# Patient Record
Sex: FEMALE | Race: BLACK OR AFRICAN AMERICAN | NOT HISPANIC OR LATINO | Employment: OTHER | URBAN - METROPOLITAN AREA
[De-identification: names, ages, dates, MRNs, and addresses within clinical notes are randomized per-mention and may not be internally consistent; named-entity substitution may affect disease eponyms.]

---

## 2017-09-25 ENCOUNTER — HOSPITAL ENCOUNTER (OUTPATIENT)
Dept: MEDSURG UNIT | Facility: HOSPITAL | Age: 62
End: 2017-09-26
Attending: INTERNAL MEDICINE | Admitting: INTERNAL MEDICINE

## 2017-09-25 LAB
ABS NEUT (OLG): 4.69 X10(3)/MCL (ref 2.1–9.2)
ALBUMIN SERPL-MCNC: 4 GM/DL (ref 3.4–5)
ALBUMIN/GLOB SERPL: 1 RATIO (ref 1–2)
ALP SERPL-CCNC: 43 UNIT/L (ref 45–117)
ALT SERPL-CCNC: 18 UNIT/L (ref 12–78)
APPEARANCE, UA: CLEAR
AST SERPL-CCNC: 13 UNIT/L (ref 15–37)
BACTERIA #/AREA URNS AUTO: ABNORMAL /[HPF]
BASOPHILS # BLD AUTO: 0.06 X10(3)/MCL
BASOPHILS NFR BLD AUTO: 1 % (ref 0–1)
BILIRUB SERPL-MCNC: 0.5 MG/DL (ref 0.2–1)
BILIRUB UR QL STRIP: NEGATIVE
BILIRUBIN DIRECT+TOT PNL SERPL-MCNC: 0.1 MG/DL
BILIRUBIN DIRECT+TOT PNL SERPL-MCNC: 0.4 MG/DL
BNP BLD-MCNC: <20 PG/ML (ref 0–100)
BUN SERPL-MCNC: 15 MG/DL (ref 7–18)
CALCIUM SERPL-MCNC: 9.1 MG/DL (ref 8.5–10.1)
CHLORIDE SERPL-SCNC: 97 MMOL/L (ref 98–107)
CK MB SERPL-MCNC: 1.5 NG/ML (ref 1–3.6)
CK SERPL-CCNC: 147 UNIT/L (ref 26–192)
CO2 SERPL-SCNC: 30 MMOL/L (ref 21–32)
COLOR UR: ABNORMAL
CREAT SERPL-MCNC: 0.8 MG/DL (ref 0.6–1.3)
EOSINOPHIL # BLD AUTO: 0.54 10*3/UL
EOSINOPHIL NFR BLD AUTO: 6 % (ref 0–5)
ERYTHROCYTE [DISTWIDTH] IN BLOOD BY AUTOMATED COUNT: 13.2 % (ref 11.5–14.5)
GLOBULIN SER-MCNC: 4.5 GM/ML (ref 2.3–3.5)
GLUCOSE (UA): NORMAL
GLUCOSE SERPL-MCNC: 129 MG/DL (ref 74–106)
HCO3 UR-SCNC: 31.3 MMOL/L (ref 22–26)
HCT VFR BLD AUTO: 36.7 % (ref 35–46)
HGB BLD-MCNC: 12.1 GM/DL (ref 12–16)
HGB UR QL STRIP: NEGATIVE
HYALINE CASTS #/AREA URNS LPF: ABNORMAL /[LPF]
IMM GRANULOCYTES # BLD AUTO: 0.04 10*3/UL
IMM GRANULOCYTES NFR BLD AUTO: 0 %
KETONES UR QL STRIP: NEGATIVE
LEUKOCYTE ESTERASE UR QL STRIP: 75 LEU/UL
LYMPHOCYTES # BLD AUTO: 2.56 X10(3)/MCL
LYMPHOCYTES NFR BLD AUTO: 30 % (ref 15–40)
MCH RBC QN AUTO: 28.5 PG (ref 26–34)
MCHC RBC AUTO-ENTMCNC: 33 GM/DL (ref 31–37)
MCV RBC AUTO: 86.6 FL (ref 80–100)
MONOCYTES # BLD AUTO: 0.71 X10(3)/MCL
MONOCYTES NFR BLD AUTO: 8 % (ref 4–12)
MUCOUS THREADS URNS QL MICRO: ABNORMAL
NEUTROPHILS # BLD AUTO: 4.69 X10(3)/MCL
NEUTROPHILS NFR BLD AUTO: 54 X10(3)/MCL
NITRITE UR QL STRIP: NEGATIVE
O2 HGB ARTERIAL: 93.1 % (ref 94–100)
PCO2 BLDA: 42 MMHG (ref 35–45)
PH SMN: 7.48 [PH] (ref 7.35–7.45)
PH UR STRIP: 5.5 [PH] (ref 4.5–8)
PLATELET # BLD AUTO: 358 X10(3)/MCL (ref 130–400)
PMV BLD AUTO: 11 FL (ref 7.4–10.4)
PO2 BLDA: 64 MMHG (ref 75–100)
POC ALLENS TEST: POSITIVE
POC BE: 7 (ref -2–2)
POC CO HGB: 2.1 % (ref 0.5–1.5)
POC CO2: 32.6 MMOL/L (ref 22–27)
POC MET HGB: 0.8 % (ref 0–1.5)
POC SAMPLESOURCE: ABNORMAL
POC SATURATED O2: 95.9 % (ref 95–98)
POC SITE: ABNORMAL
POC THB: 13 GM/DL (ref 12–18)
POC TREATMENT: ABNORMAL
POC TROPONIN: 0 NG/ML (ref 0–0.08)
POTASSIUM SERPL-SCNC: 3.1 MMOL/L (ref 3.5–5.1)
PROT SERPL-MCNC: 8.5 GM/DL (ref 6.4–8.2)
PROT UR QL STRIP: NEGATIVE
RBC # BLD AUTO: 4.24 X10(6)/MCL (ref 4–5.2)
RBC #/AREA URNS AUTO: ABNORMAL /[HPF]
SODIUM SERPL-SCNC: 136 MMOL/L (ref 136–145)
SP GR UR STRIP: 1.01 (ref 1–1.03)
SQUAMOUS #/AREA URNS LPF: ABNORMAL /[LPF]
TROPONIN I SERPL-MCNC: <0.015 NG/ML (ref 0–0.05)
UROBILINOGEN UR STRIP-ACNC: NORMAL
WBC # SPEC AUTO: 8.6 X10(3)/MCL (ref 4.5–11)
WBC #/AREA URNS AUTO: ABNORMAL /HPF

## 2017-09-26 LAB
BUN SERPL-MCNC: 14 MG/DL (ref 7–18)
CALCIUM SERPL-MCNC: 9.3 MG/DL (ref 8.5–10.1)
CHLORIDE SERPL-SCNC: 98 MMOL/L (ref 98–107)
CO2 SERPL-SCNC: 29 MMOL/L (ref 21–32)
CREAT SERPL-MCNC: 0.8 MG/DL (ref 0.6–1.3)
GLUCOSE SERPL-MCNC: 290 MG/DL (ref 74–106)
POTASSIUM SERPL-SCNC: 4.1 MMOL/L (ref 3.5–5.1)
SODIUM SERPL-SCNC: 134 MMOL/L (ref 136–145)

## 2017-10-09 ENCOUNTER — HISTORICAL (OUTPATIENT)
Dept: INTERNAL MEDICINE | Facility: CLINIC | Age: 62
End: 2017-10-09

## 2017-10-13 ENCOUNTER — HISTORICAL (OUTPATIENT)
Dept: INTERNAL MEDICINE | Facility: CLINIC | Age: 62
End: 2017-10-13

## 2017-10-13 LAB
APPEARANCE, UA: CLEAR
BACTERIA #/AREA URNS AUTO: ABNORMAL /[HPF]
BILIRUB UR QL STRIP: NEGATIVE
CHOLEST SERPL-MCNC: 134 MG/DL
CHOLEST/HDLC SERPL: 2.8 {RATIO} (ref 0–4.4)
COLOR UR: YELLOW
CREAT UR-MCNC: 206 MG/DL
EST. AVERAGE GLUCOSE BLD GHB EST-MCNC: 194 MG/DL
GLUCOSE (UA): NORMAL
HBA1C MFR BLD: 8.4 % (ref 4.2–6.3)
HDLC SERPL-MCNC: 48 MG/DL
HGB UR QL STRIP: NEGATIVE
HYALINE CASTS #/AREA URNS LPF: ABNORMAL /[LPF]
KETONES UR QL STRIP: NEGATIVE
LDLC SERPL CALC-MCNC: 54 MG/DL (ref 0–130)
LEUKOCYTE ESTERASE UR QL STRIP: NEGATIVE
MICROALBUMIN UR-MCNC: 11.6 MG/L (ref 0–19)
MICROALBUMIN/CREAT RATIO PNL UR: 5.6 MCG/MG CR (ref 0–29)
NITRITE UR QL STRIP: NEGATIVE
PH UR STRIP: 6 [PH] (ref 4.5–8)
PROT UR QL STRIP: 20 MG/DL
RBC #/AREA URNS AUTO: ABNORMAL /[HPF]
SP GR UR STRIP: 1.02 (ref 1–1.03)
SQUAMOUS #/AREA URNS LPF: ABNORMAL /[LPF]
TRIGL SERPL-MCNC: 160 MG/DL
UROBILINOGEN UR STRIP-ACNC: NORMAL
VLDLC SERPL CALC-MCNC: 32 MG/DL
WBC #/AREA URNS AUTO: ABNORMAL /HPF

## 2022-04-10 ENCOUNTER — HISTORICAL (OUTPATIENT)
Dept: ADMINISTRATIVE | Facility: HOSPITAL | Age: 67
End: 2022-04-10
Payer: MEDICARE

## 2022-04-26 VITALS
HEIGHT: 62 IN | BODY MASS INDEX: 40.29 KG/M2 | SYSTOLIC BLOOD PRESSURE: 117 MMHG | WEIGHT: 218.94 LBS | DIASTOLIC BLOOD PRESSURE: 74 MMHG

## 2022-04-30 NOTE — ED PROVIDER NOTES
Patient:   Yosvany Song            MRN: [id number]            FIN: [id number]               Age:   62 years     Sex:  Female     :  1955   Associated Diagnoses:   PAH (pulmonary artery hypertension); H/O ventral hernia repair   Author:   Vinh Cuevas MD      Basic Information   Time seen: Date & time 2017 09:15:00.   History source: Patient.   Arrival mode: Private vehicle.   History limitation: None.   Additional information: Chief Complaint from Nursing Triage Note : Chief Complaint   2017 9:12 CDT       Chief Complaint           pt here for sob x 1 month  .      History of Present Illness   The patient presents with SOB past 1 month gradually worsening. Hx of asthma. on Augmentin have 2 more days out of 10 days.  The onset was gradual.  The course/duration of symptoms is constant.  Degree at onset mild.  Degree at present mild.  The Exacerbating factors is exertion.  The Relieving factors is rest.  Risk factors consist of diabetes mellitus, hypertension and asthma.  Therapy today: none.  Associated symptoms: none.        Review of Systems   Constitutional symptoms:  No fever, no weakness, no fatigue, no decreased activity.    Skin symptoms:  No jaundice, no rash, no pruritus.    Eye symptoms:  No recent vision problems,    ENMT symptoms:  No sore throat, no nasal congestion.    Respiratory symptoms:  Shortness of breath, no orthopnea, no cough, no sputum production.    Cardiovascular symptoms:  No chest pain, no palpitations, no syncope, no diaphoresis.    Gastrointestinal symptoms:  No abdominal pain, no nausea, no vomiting, no diarrhea.    Genitourinary symptoms:  No dysuria,    Musculoskeletal symptoms:  right side flank pain, no back pain, no Muscle pain, no Joint pain.    Neurologic symptoms:  No headache, no dizziness, no altered level of consciousness.    Psychiatric symptoms:  No anxiety, no depression, no sleeping problems, no substance abuse.    Endocrine symptoms:  No  polyuria, no polydipsia, no polyphagia, no hyperglycemia.    Hematologic/Lymphatic symptoms:  Bleeding tendency negative, bruising tendency negative, no petechiae, no gum bleeding.    Allergy/immunologic symptoms:  No food allergies, no recurrent infections, no impaired immunity.              Additional review of systems information: All other systems reviewed and otherwise negative.      Health Status   Allergies:    No active allergies have been recorded.,    No qualifying data available  .      Past Medical/ Family/ Social History   Medical history:    No active or resolved past medical history items have been selected or recorded., Reviewed as documented in chart.   Surgical history:    No active procedure history items have been selected or recorded., Reviewed as documented in chart.   Family history:    No family history items have been selected or recorded., Reviewed as documented in chart.   Social history:    Social & Psychosocial Habits    No Data Available  , Reviewed as documented in chart.   Problem list:    No qualifying data available  .      Physical Examination               Vital Signs             Time:  9/25/2017 09:28:00.   Vital Signs   9/25/2017 9:12 CDT       Temperature Oral          36.9 DegC                             Peripheral Pulse Rate     110 bpm  HI                             Respiratory Rate          24 br/min                             SpO2                      88 %  LOW                             Oxygen Therapy            Room air                             Systolic Blood Pressure   135 mmHg                             Diastolic Blood Pressure  70 mmHg  .      Vital Signs (last 24 hrs)_____  Last Charted___________  Temp Oral     36.9 DegC  (SEP 25 09:12)  Heart Rate Peripheral   H 110bpm  (SEP 25 09:12)  Resp Rate         24 br/min  (SEP 25 09:12)  SBP      135 mmHg  (SEP 25 09:12)  DBP      70 mmHg  (SEP 25 09:12)  SpO2      L 88%  (SEP 25 09:12)  .   Basic Oxygen  Information   9/25/2017 9:12 CDT       SpO2                      88 %  LOW                             Oxygen Therapy            Room air  .   General:  Alert, no acute distress.    Skin:  Warm, pink, intact, moist, no pallor, no rash, normal for ethnicity.    Head:  Normocephalic, atraumatic.    Neck:  Supple, trachea midline, no tenderness, no JVD, no carotid bruit.    Eye:  Pupils are equal, round and reactive to light, extraocular movements are intact, normal conjunctiva.    Ears, nose, mouth and throat:  Tympanic membranes clear, oral mucosa moist, no pharyngeal erythema or exudate.    Cardiovascular:  Regular rate and rhythm, No murmur, Normal peripheral perfusion, No edema.    Respiratory:  Lungs are clear to auscultation, respirations are non-labored, breath sounds are equal, Symmetrical chest wall expansion.    Chest wall:  No tenderness, No deformity.    Back:  Normal range of motion, Normal alignment, no step-offs, minimal tenderness right lower back/flank. No CVA tenderness.    Musculoskeletal:  Normal ROM, normal strength, no tenderness, 1+ pitting gerda BLE no swelling    Gastrointestinal:  Soft, Nontender, Non distended, Normal bowel sounds, No organomegaly, ventral hernia reducible.    Genitourinary   Neurological:  Alert and oriented to person, place, time, and situation, No focal neurological deficit observed, CN II-XII intact, normal sensory observed, normal motor observed.    Lymphatics:  No lymphadenopathy.   Psychiatric:  Cooperative, appropriate mood & affect, normal judgment, non-suicidal.       Medical Decision Making   Electrocardiogram:  Time 9/25/2017 09:29:00, normal sinus rhythm, no ectopy, Non specefic T wave changes.    Results review:     No qualifying data available.      Reexamination/ Reevaluation   Time: 9/25/2017 14:40:00 .   Vital signs   Basic Oxygen Information   9/25/2017 9:12 CDT       SpO2                      88 %  LOW                             Oxygen Therapy             Room air     Notes: patient desats to 69 percent with exertion.      Impression and Plan   Diagnosis   PAH (pulmonary artery hypertension) ([id number] I27.2)   H/O ventral hernia repair ([id number] Z98.890)      Calls-Consults   -  9/25/2017 14:40:00 , IM on call, consult.    Plan   Disposition: Medically cleared, Admit time  9/25/2017 14:40:00, Admit to Inpatient Telemetry Unit, Dispositioned by: Time: 9/25/2017 13:53:00, Faith BECERRA, Vinh PAGE,  Discharged: To home, time  9/25/2017 13:53:00    Patient was given the following educational materials:     Patient was given the following educational materials: Pulmonary Hypertension, Pulmonary Hypertension, Pulmonary Hypertension.      Patient was given the following educational materials: Pulmonary Hypertension, Pulmonary Hypertension.     Follow up with:     Follow up with: No No PCP; [facility] Within 1 week, [facility] Within 1 week; No No PCP; [facility] Tuesday Aug 29th.      Follow up with: No No PCP; [facility] Within 1 week.     Counseled: Patient, Regarding diagnosis, Regarding treatment plan, Patient indicated understanding of instructions.    Orders:  Launch Orders   Admit/Transfer/Discharge:  PFT studies (Order): 9/25/2017 14:06 CDT, PFT studies

## 2022-04-30 NOTE — H&P
CHIEF COMPLAINT:  Shortness of breath, progressively getting worse over the past 1 month.    HISTORY OF PRESENT ILLNESS:  The patient is a 62-year-old  female with a history of asthma, COPD, hypertension, and diabetes, who presented to the emergency room today complaining of shortness of breath.  Her shortness of breath has progressively been getting worse over the past several weeks.  She stated that she is unable to carry out her activities of daily living, such as cleaning of her dishes or going to the bathroom without getting short of breath.  She mainly lies in bed because her shortness of breath limits her physical activity.  She has been seen in an outside emergency room, where she was given antibiotics, such as azithromycin and Levaquin, which have not been helping her.  In fact, these antibiotics have caused her to have skin rashes and make her itch.  She denies any chest pain, fevers, chills, or cough.  Her shortness of breath is mostly present when she is moving around.  However, when she is at rest or lying down, she denies having any shortness of breath.  Regarding her asthma, she was diagnosed 10 or 11 years ago.  She has only been taking albuterol inhalers for it.  She did state that she had a PFT done in the past, however does not know results of the PFT.  She denies being a smoker or ever smoked in the past.  However, she stated that her  used to smoke, and she was around secondhand smoke frequently.  She was apparently supposed to be discharged from the emergency room; however, upon ambulating, she started to desat down to the 70s.  Her current vital signs revealed that she was saturating at 96% on room air.  Medicine On-Call was consulted for desaturation down to the 70s upon ambulation.    ALLERGIES:  Azithromycin and Levaquin caused the patient to break out into a rash and pruritus.    PAST MEDICAL HISTORY:  Asthma, COPD, unknown stage, hypertension, diabetes,  dyslipidemia.    PAST SURGICAL HISTORY:  Patient had a ventral hernia surgery as well as fibroid surgery several years ago.    PAST FAMILY HISTORY:  Reviewed and noncontributory.    PAST SOCIAL HISTORY:  Denies any alcohol use, denies any tobacco use, denies any illicit drug use.  However, does state that she used to live around people and used to have secondhand smoke.    REVIEW OF SYSTEMS:  CONSTITUTIONAL:  No fever.  No night sweats, chills, or fatigue.  The patient does have fatigue secondary to exertion causing her to be short of breath.     HEENT:  No vision changes, photophobia, or hearing loss,   CVS:  No chest pain, palpitations, or orthopnea.     RESPIRATORY:  Patient does complain of shortness of breath, worse with exertion.  Denies any cough or wheezing.     GI:  No abdominal pain.  No nausea, vomiting, or diarrhea.     :  No dysuria.  No urinary incontinence.   MUSCULOSKELETAL:  No joint stiffness.  No pain, swelling, or weakness.   SKIN:  No rashes or lesions.     NEURO:  No headaches, syncope, seizures, or numbness.   PSYCHIATRIC:  No depression or anxiety.    HOME MEDICATIONS:    1. Amlodipine/benazepril 5 mg/20 mg daily.    2. Glimepiride 4 mg daily.   3. HCTZ 50 mg daily.    4. Metformin 1000 mg b.i.d.  5. Pravastatin 40 mg daily.  6. ProAir inhalation aerosol.  7. Trulicity subcutaneous weekly.    PHYSICAL EXAM:  LATEST VITAL SIGNS:  Pulse rate 91, saturating at 96% on room air, blood pressure 124/52, temperature 36.9 degrees Celsius.  GENERAL:  The patient is awake, alert, oriented x3.  No acute distress.  She is alert and cooperative.   HEENT:  Extraocular movements are intact:  Normal conjunctivae.   NECK:  No lymphadenopathy.  No JVD.  Supple.   CVS:  S1, S2 are normal, regular rate and rhythm.  No murmurs, rubs, gallops.   RESPIRATORY:  Breath sounds are clear to auscultate bilaterally.  No rhonchi, rales, or wheezing is present.   GI:  No tenderness to palpation, nondistended.  Patient is  obese.   SKIN:  Not jaundiced.  Warm.  No rashes noted.   MUSCULOSKELETAL:  Normal range of motion.  No joint tenderness.  Normal muscular development.     EXTREMITIES:  No peripheral edema.  Peripheral pulses are intact.   NEURO:  Cranial nerves 2-12 grossly intact.  Strength and sensation symmetric and intact throughout.  No focal neurological deficits.    LABORATORY:  CMP reveals a sodium of 136, potassium 3.1, chloride 97, CO2 30, glucose 129, creatinine 0.80.  Cardiac enzymes:  Total , CK MB 1.5, point of care troponin 0.00.  Hematology:  WBCs 8.6, hemoglobin 12.1, hematocrit 36.7, platelets 358.  Urinalysis:  Normal glucose, negative ketones, negative protein, negative nitrites, positive leukocyte esterase; however, there are  squamous epithelial cells.  There was no bacteria seen.  Her ABGs on room air revealed a pH of 7.48, pCO2 42, pO2 64, HCO3 31.3.  EKG rate is 90, normal axis, regular rhythm.    RADIOLOGY:  Chest x-ray reviewed by me.  Lungs are clear.  Cardiomediastinal silhouette is within normal limits.  No evidence of pneumothorax or pleural effusion.  The patient did get a CT thorax with contrast in the emergency room, which revealed no pulmonary embolus identified.  There is prominence of the pulmonary trunk suggesting a component of pulmonary hypertension.    ASSESSMENT AND PLAN:    1. Acute hypoxemic respiratory failure.  2. Possible pulmonary hypertension.  3. Diabetes mellitus.  4. Hypertension.    5. Dyslipidemia.    PLAN:  Patient was admitted to Medicine secondary to desaturating while ambulating.  Unknown cause of the patient's desaturation.  There is no wheezing or rhonchi heard on physical exam.  Although she does have a history of asthma and COPD, we do not have any PFTs on our medical record.  We will start the patient on Advair and DuoNeb as needed for shortness of breath.  We will also administer some steroids to help reduce inflammation if this is reactive airways  disease.  We will continue patient's blood pressure medications with hydrochlorothiazide 50 mg daily.  We will continue her pravastatin dose.  To evaluate for her pulmonary arterial hypertension, will order an echocardiogram and will follow up on the results.  Case Management has been consulted for home oxygen if needed.  Will keep patient on nasal cannula and keep her saturations between 88% to 92%, wean off nasal cannula if the patient can tolerate off it.  We will monitor patient overnight and other recommendations to come based on these results.      Update to patients condition since original assessments included:    ( )  The History and Physical was reviewed, the patient was examined and there is no change in the patients condition.      Signature ___________________________  Date __________________  Time __________________      ______________________________  KARLA Tejeda/MICHAEL  DD:  09/25/2017  Time:  03:30PM  DT:  09/25/2017  Time:  04:05PM  Job #:  [id number]

## 2022-04-30 NOTE — H&P
Patient:   Yosvany Song            MRN: [id number]            FIN: [id number]               Age:   62 years     Sex:  Female     :  1955   Associated Diagnoses:   None   Author:   Jose Roberto BECERRA, Isidro DE SOUZA      H&P Dictacted - Job #[id number]    Patient admitted for SOB, worsened over the past 1 month.  Hx of asthma/COPD.  CT thorax revealed pulmonary hypertension.  No signs of infection noted.  Physical exam essentially normal.    Plan  Started patient on steroids, duonebs, and advair.  Ordered echocardiogram.  F/U on troponin.    Full dictaction to come.

## 2022-04-30 NOTE — DISCHARGE SUMMARY
I was present with the Resident during discharge day management.    [x  ] I discussed the case with the Resident and agree with the discharge plan as above.  [  ] I discussed the case with the Resident and agree with the discharge plan as above except:    Time spent on discharge [ 30 ] minutes

## 2022-07-22 ENCOUNTER — OFFICE VISIT (OUTPATIENT)
Dept: RHEUMATOLOGY | Facility: CLINIC | Age: 67
End: 2022-07-22
Payer: MEDICARE

## 2022-07-22 VITALS
OXYGEN SATURATION: 87 % | BODY MASS INDEX: 42.01 KG/M2 | HEART RATE: 103 BPM | RESPIRATION RATE: 18 BRPM | WEIGHT: 214 LBS | SYSTOLIC BLOOD PRESSURE: 139 MMHG | HEIGHT: 60 IN | TEMPERATURE: 99 F | DIASTOLIC BLOOD PRESSURE: 79 MMHG

## 2022-07-22 DIAGNOSIS — G25.81 RESTLESS LEGS SYNDROME (RLS): ICD-10-CM

## 2022-07-22 DIAGNOSIS — M05.9 SEROPOSITIVE RHEUMATOID ARTHRITIS: Primary | ICD-10-CM

## 2022-07-22 DIAGNOSIS — F51.01 PRIMARY INSOMNIA: ICD-10-CM

## 2022-07-22 DIAGNOSIS — M79.7 FIBROMYALGIA SYNDROME: ICD-10-CM

## 2022-07-22 PROCEDURE — 99999 PR PBB SHADOW E&M-EST. PATIENT-LVL III: ICD-10-PCS | Mod: PBBFAC,,, | Performed by: INTERNAL MEDICINE

## 2022-07-22 PROCEDURE — 99204 PR OFFICE/OUTPT VISIT, NEW, LEVL IV, 45-59 MIN: ICD-10-PCS | Mod: S$PBB,,, | Performed by: INTERNAL MEDICINE

## 2022-07-22 PROCEDURE — 99204 OFFICE O/P NEW MOD 45 MIN: CPT | Mod: S$PBB,,, | Performed by: INTERNAL MEDICINE

## 2022-07-22 PROCEDURE — 99213 OFFICE O/P EST LOW 20 MIN: CPT | Mod: PBBFAC | Performed by: INTERNAL MEDICINE

## 2022-07-22 PROCEDURE — 99999 PR PBB SHADOW E&M-EST. PATIENT-LVL III: CPT | Mod: PBBFAC,,, | Performed by: INTERNAL MEDICINE

## 2022-07-22 RX ORDER — PRAMIPEXOLE DIHYDROCHLORIDE 0.12 MG/1
0.12 TABLET ORAL NIGHTLY
Qty: 30 TABLET | Refills: 11 | Status: SHIPPED | OUTPATIENT
Start: 2022-07-22 | End: 2022-11-04 | Stop reason: SDUPTHER

## 2022-07-22 RX ORDER — PRAVASTATIN SODIUM 40 MG/1
40 TABLET ORAL DAILY
COMMUNITY
Start: 2022-07-11

## 2022-07-22 RX ORDER — OMEPRAZOLE 40 MG/1
40 CAPSULE, DELAYED RELEASE ORAL DAILY PRN
COMMUNITY
Start: 2022-05-11 | End: 2022-11-04 | Stop reason: SDUPTHER

## 2022-07-22 RX ORDER — ALENDRONATE SODIUM 70 MG/1
70 TABLET ORAL
COMMUNITY
Start: 2022-06-21

## 2022-07-22 RX ORDER — SITAGLIPTIN 100 MG/1
100 TABLET, FILM COATED ORAL DAILY
COMMUNITY
Start: 2022-07-11

## 2022-07-22 RX ORDER — AMLODIPINE AND BENAZEPRIL HYDROCHLORIDE 5; 20 MG/1; MG/1
2 CAPSULE ORAL DAILY
COMMUNITY
Start: 2022-07-11

## 2022-07-22 RX ORDER — FOLIC ACID 1 MG/1
1000 TABLET ORAL DAILY
COMMUNITY
Start: 2022-06-10 | End: 2022-11-04 | Stop reason: SDUPTHER

## 2022-07-22 RX ORDER — BUDESONIDE AND FORMOTEROL FUMARATE DIHYDRATE 160; 4.5 UG/1; UG/1
2 AEROSOL RESPIRATORY (INHALATION) DAILY PRN
COMMUNITY

## 2022-07-22 RX ORDER — GLIMEPIRIDE 4 MG/1
4 TABLET ORAL EVERY MORNING
COMMUNITY
Start: 2022-07-11

## 2022-07-22 RX ORDER — METFORMIN HYDROCHLORIDE 1000 MG/1
1000 TABLET ORAL 2 TIMES DAILY
COMMUNITY
Start: 2022-06-10

## 2022-07-22 RX ORDER — ALBUTEROL SULFATE 90 UG/1
2 AEROSOL, METERED RESPIRATORY (INHALATION) EVERY 6 HOURS PRN
COMMUNITY

## 2022-07-22 RX ORDER — METHOTREXATE 2.5 MG/1
10 TABLET ORAL
Qty: 20 TABLET | Refills: 5 | Status: SHIPPED | OUTPATIENT
Start: 2022-07-22 | End: 2022-11-04 | Stop reason: SDUPTHER

## 2022-07-22 RX ORDER — HYDROCHLOROTHIAZIDE 25 MG/1
25 TABLET ORAL DAILY
COMMUNITY
Start: 2022-07-11

## 2022-07-22 RX ORDER — EMPAGLIFLOZIN 10 MG/1
10 TABLET, FILM COATED ORAL DAILY
COMMUNITY
Start: 2022-03-16

## 2022-07-22 NOTE — PROGRESS NOTES
Subjective:       Patient ID: Yosvany Song is a 66 y.o. female.    Chief Complaint: New patient  (New patient -RA)    Pt  is complaining of joint pain involving her MCP PIP wrist elbow shoulders hips knees and ankles bilaterally.  Is 10/10 in intensity dull in quality and continuous.  It is associated with a morning stiffness lasting for more than 60 minutes. Pt reports having difficulty maintaining a good night of sleep and this has been associated with myalgia of 10/10 in intensity.  This pain is dull continuous and gets worse mainly at night.  It is associated with fatigue.  No fever no chills no others.      Review of Systems   Constitutional: Negative for appetite change, chills and fever.   HENT: Negative for congestion, ear pain, mouth sores, nosebleeds and trouble swallowing.    Eyes: Negative for photophobia and discharge.   Respiratory: Negative for chest tightness and shortness of breath.    Cardiovascular: Negative for chest pain.   Gastrointestinal: Negative for abdominal pain and vomiting.   Endocrine: Negative.    Genitourinary: Negative for hematuria.   Musculoskeletal:        As per HPI   Skin: Negative for rash.   Neurological: Negative for weakness.         Objective:   /79 (BP Location: Right arm, Patient Position: Sitting, BP Method: Large (Automatic))   Pulse 103   Temp 98.6 °F (37 °C) (Oral)   Resp 18   Ht 5' (1.524 m)   Wt 97.1 kg (214 lb)   SpO2 (!) 87%   BMI 41.79 kg/m²      Physical Exam   Constitutional: She is oriented to person, place, and time. She appears well-developed and well-nourished. No distress.   HENT:   Head: Normocephalic and atraumatic.   Right Ear: External ear normal.   Left Ear: External ear normal.   Eyes: Pupils are equal, round, and reactive to light.   Cardiovascular: Normal rate, regular rhythm and normal heart sounds.   Pulmonary/Chest: Breath sounds normal.   Abdominal: Soft. There is no abdominal tenderness.   Musculoskeletal:      Right shoulder:  Tenderness present.      Left shoulder: Tenderness present.      Right elbow: Tenderness present.      Left elbow: Tenderness present.      Right wrist: Tenderness present.      Left wrist: Tenderness present.      Cervical back: Neck supple.      Right hip: Tenderness present.      Left hip: Tenderness present.      Right knee: Tenderness present.      Left knee: Tenderness present.      Right ankle: Tenderness present.      Left ankle: Tenderness present.   Lymphadenopathy:     She has no cervical adenopathy.   Neurological: She is alert and oriented to person, place, and time. She displays normal reflexes. No cranial nerve deficit or sensory deficit. She exhibits normal muscle tone. Coordination normal.   Skin: No rash noted. No erythema.   Vitals reviewed.      Right Side Rheumatological Exam     The patient is tender to palpation of the shoulder, elbow, wrist, knee, 1st PIP, 1st MCP, 2nd PIP, 2nd MCP, 3rd PIP, 3rd MCP, 4th PIP, 4th MCP, 5th PIP, hip, ankle, 1st MTP, 2nd MTP, 3rd MTP, 4th MTP, 5th MTP, 1st toe IP, 2nd toe IP, 3rd toe IP, 4th toe IP and 5th toe IP    Left Side Rheumatological Exam     The patient is tender to palpation of the shoulder, elbow, wrist, knee, 1st PIP, 1st MCP, 2nd PIP, 2nd MCP, 3rd PIP, 3rd MCP, 4th PIP, 4th MCP, 5th PIP, 5th MCP, hip, ankle, 1st MTP, 2nd MTP, 3rd MTP, 4th MTP, 5th MTP, 1st toe IP, 2nd toe IP, 3rd toe IP, 4th toe IP and 5th toe IP.         Completed Fibromyalgia exam 18/18 tender points.  No data to display     Assessment:       1. Seropositive rheumatoid arthritis    2. Fibromyalgia syndrome    3. Primary insomnia    4. Restless legs syndrome (RLS)            Plan:       Problem List Items Addressed This Visit    None     Visit Diagnoses     Seropositive rheumatoid arthritis    -  Primary    Relevant Medications    methotrexate 2.5 MG Tab    pramipexole (MIRAPEX) 0.125 MG tablet    Other Relevant Orders    Quantiferon Gold TB    CBC Auto Differential     Comprehensive Metabolic Panel    CRP, High Sensitivity    Vitamin D    Rheumatoid Quantitative    Cyclic Citrullinated Peptide Antibody, IgG    Antinuclear Ab, HEp-2 Substrate    Hepatitis B Surface Antigen    Hepatitis C Antibody    TSH    T4, Free    Fibromyalgia syndrome        Relevant Medications    methotrexate 2.5 MG Tab    pramipexole (MIRAPEX) 0.125 MG tablet    Other Relevant Orders    Quantiferon Gold TB    CBC Auto Differential    Comprehensive Metabolic Panel    CRP, High Sensitivity    Vitamin D    Rheumatoid Quantitative    Cyclic Citrullinated Peptide Antibody, IgG    Antinuclear Ab, HEp-2 Substrate    Hepatitis B Surface Antigen    Hepatitis C Antibody    TSH    T4, Free    Primary insomnia        Relevant Medications    methotrexate 2.5 MG Tab    pramipexole (MIRAPEX) 0.125 MG tablet    Other Relevant Orders    Quantiferon Gold TB    CBC Auto Differential    Comprehensive Metabolic Panel    CRP, High Sensitivity    Vitamin D    Rheumatoid Quantitative    Cyclic Citrullinated Peptide Antibody, IgG    Antinuclear Ab, HEp-2 Substrate    Hepatitis B Surface Antigen    Hepatitis C Antibody    TSH    T4, Free    Restless legs syndrome (RLS)        Relevant Medications    methotrexate 2.5 MG Tab    pramipexole (MIRAPEX) 0.125 MG tablet    Other Relevant Orders    Quantiferon Gold TB    CBC Auto Differential    Comprehensive Metabolic Panel    CRP, High Sensitivity    Vitamin D    Rheumatoid Quantitative    Cyclic Citrullinated Peptide Antibody, IgG    Antinuclear Ab, HEp-2 Substrate    Hepatitis B Surface Antigen    Hepatitis C Antibody    TSH    T4, Free

## 2022-11-04 ENCOUNTER — OFFICE VISIT (OUTPATIENT)
Dept: RHEUMATOLOGY | Facility: CLINIC | Age: 67
End: 2022-11-04
Payer: MEDICARE

## 2022-11-04 VITALS
HEIGHT: 60 IN | HEART RATE: 74 BPM | WEIGHT: 219.81 LBS | DIASTOLIC BLOOD PRESSURE: 74 MMHG | RESPIRATION RATE: 18 BRPM | OXYGEN SATURATION: 89 % | BODY MASS INDEX: 43.15 KG/M2 | TEMPERATURE: 99 F | SYSTOLIC BLOOD PRESSURE: 114 MMHG

## 2022-11-04 DIAGNOSIS — M79.7 FIBROMYALGIA SYNDROME: ICD-10-CM

## 2022-11-04 DIAGNOSIS — G25.81 RESTLESS LEGS SYNDROME (RLS): ICD-10-CM

## 2022-11-04 DIAGNOSIS — F51.01 PRIMARY INSOMNIA: ICD-10-CM

## 2022-11-04 DIAGNOSIS — M05.9 SEROPOSITIVE RHEUMATOID ARTHRITIS: Primary | ICD-10-CM

## 2022-11-04 PROCEDURE — 99999 PR PBB SHADOW E&M-EST. PATIENT-LVL IV: ICD-10-PCS | Mod: PBBFAC,,, | Performed by: INTERNAL MEDICINE

## 2022-11-04 PROCEDURE — 99999 PR PBB SHADOW E&M-EST. PATIENT-LVL IV: CPT | Mod: PBBFAC,,, | Performed by: INTERNAL MEDICINE

## 2022-11-04 PROCEDURE — 99214 OFFICE O/P EST MOD 30 MIN: CPT | Mod: PBBFAC | Performed by: INTERNAL MEDICINE

## 2022-11-04 PROCEDURE — 99214 PR OFFICE/OUTPT VISIT, EST, LEVL IV, 30-39 MIN: ICD-10-PCS | Mod: S$PBB,,, | Performed by: INTERNAL MEDICINE

## 2022-11-04 PROCEDURE — 99214 OFFICE O/P EST MOD 30 MIN: CPT | Mod: S$PBB,,, | Performed by: INTERNAL MEDICINE

## 2022-11-04 RX ORDER — SEMAGLUTIDE 1.34 MG/ML
0.5 INJECTION, SOLUTION SUBCUTANEOUS
COMMUNITY
Start: 2022-10-10

## 2022-11-04 RX ORDER — METHOTREXATE 2.5 MG/1
10 TABLET ORAL
Qty: 20 TABLET | Refills: 5 | Status: SHIPPED | OUTPATIENT
Start: 2022-11-04 | End: 2023-05-03

## 2022-11-04 RX ORDER — PRAMIPEXOLE DIHYDROCHLORIDE 0.25 MG/1
0.25 TABLET ORAL NIGHTLY
Qty: 30 TABLET | Refills: 11 | Status: SHIPPED | OUTPATIENT
Start: 2022-11-04 | End: 2023-11-04

## 2022-11-04 RX ORDER — OMEPRAZOLE 40 MG/1
40 CAPSULE, DELAYED RELEASE ORAL EVERY MORNING
Qty: 30 CAPSULE | Refills: 5 | Status: SHIPPED | OUTPATIENT
Start: 2022-11-04

## 2022-11-04 RX ORDER — FOLIC ACID 1 MG/1
1000 TABLET ORAL DAILY
Qty: 30 TABLET | Refills: 5 | Status: SHIPPED | OUTPATIENT
Start: 2022-11-04

## 2022-11-04 NOTE — PROGRESS NOTES
Subjective:       Patient ID: Yosvany Song is a 67 y.o. female.    Chief Complaint: Disease Management (3-month follow-up )    The patient is complaining of joint pain involving the MCP PIP wrist elbow shoulders hips knees and ankles bilaterally.  The pain is 2/10 in intensity dull in quality and continuous.  That is associated with a morning stiffness lasting for less than 60 minutes.  Is also having difficulty maintaining a good night of sleep.  This has been associated with myalgias.  Muscle aches are 5/10 in intensity dull in quality and continuous.  They are associated with fatigue.  No fever no chills no others.      Review of Systems   Constitutional:  Negative for appetite change, chills and fever.   HENT:  Negative for congestion, ear pain, mouth sores, nosebleeds and trouble swallowing.    Eyes:  Negative for photophobia and discharge.   Respiratory:  Negative for chest tightness and shortness of breath.    Cardiovascular:  Negative for chest pain.   Gastrointestinal:  Negative for abdominal pain and vomiting.   Endocrine: Negative.    Genitourinary:  Negative for hematuria.   Musculoskeletal:         As per HPI   Skin:  Negative for rash.   Neurological:  Negative for weakness.       Objective:   /74 (BP Location: Right arm, Patient Position: Sitting, BP Method: Large (Automatic))   Pulse 74   Temp 98.6 °F (37 °C) (Oral)   Resp 18   Ht 5' (1.524 m)   Wt 99.7 kg (219 lb 12.8 oz)   SpO2 (!) 89%   BMI 42.93 kg/m²      Physical Exam   Constitutional: She is oriented to person, place, and time. She appears well-developed and well-nourished. No distress.   HENT:   Head: Normocephalic and atraumatic.   Right Ear: External ear normal.   Left Ear: External ear normal.   Eyes: Pupils are equal, round, and reactive to light.   Cardiovascular: Normal rate, regular rhythm and normal heart sounds.   Pulmonary/Chest: Breath sounds normal.   Abdominal: Soft. There is no abdominal tenderness.    Musculoskeletal:      Right shoulder: Normal.      Left shoulder: Normal.      Right elbow: Normal.      Left elbow: Normal.      Right wrist: Normal.      Left wrist: Normal.      Cervical back: Neck supple.      Right hip: Normal.      Left hip: Normal.      Right knee: Normal.      Left knee: Normal.   Lymphadenopathy:     She has no cervical adenopathy.   Neurological: She is alert and oriented to person, place, and time. She displays normal reflexes. No cranial nerve deficit or sensory deficit. She exhibits normal muscle tone. Coordination normal.   Skin: No rash noted. No erythema.   Vitals reviewed.      Right Side Rheumatological Exam     Examination finds the shoulder, elbow, wrist, knee, 1st PIP, 1st MCP, 2nd PIP, 2nd MCP, 3rd PIP, 3rd MCP, 4th PIP, 4th MCP, 5th PIP, 5th MCP, temporomandibular, hip, ankle, 1st MTP, 2nd MTP, 3rd MTP, 4th MTP and 5th MTP normal.    Left Side Rheumatological Exam     Examination finds the shoulder, elbow, wrist, knee, 1st PIP, 1st MCP, 2nd PIP, 2nd MCP, 3rd PIP, 3rd MCP, 4th PIP, 4th MCP, 5th PIP, 5th MCP, temporomandibular, hip, ankle, 1st MTP, 2nd MTP, 3rd MTP, 4th MTP and 5th MTP normal.       Completed Fibromyalgia exam 18/18 tender points.  No data to display     Assessment:       1. Seropositive rheumatoid arthritis    2. Fibromyalgia syndrome    3. Primary insomnia    4. Restless legs syndrome (RLS)            Medication List with Changes/Refills   Current Medications    ALBUTEROL (PROVENTIL/VENTOLIN HFA) 90 MCG/ACTUATION INHALER    Inhale 2 puffs into the lungs every 6 (six) hours as needed.       Start Date: --        End Date: --    ALENDRONATE (FOSAMAX) 70 MG TABLET    Take 70 mg by mouth every 7 days.       Start Date: 6/21/2022 End Date: --    AMLODIPINE-BENAZEPRIL 5-20 MG (LOTREL) 5-20 MG PER CAPSULE    Take 2 capsules by mouth once daily.       Start Date: 7/11/2022 End Date: --    BUDESONIDE-FORMOTEROL 160-4.5 MCG (SYMBICORT) 160-4.5 MCG/ACTUATION HFAA     Inhale 2 puffs into the lungs daily as needed.       Start Date: --        End Date: --    GLIMEPIRIDE (AMARYL) 4 MG TABLET    Take 4 mg by mouth every morning.       Start Date: 7/11/2022 End Date: --    HYDROCHLOROTHIAZIDE (HYDRODIURIL) 25 MG TABLET    Take 25 mg by mouth once daily.       Start Date: 7/11/2022 End Date: --    JANUVIA 100 MG TAB    Take 100 mg by mouth once daily.       Start Date: 7/11/2022 End Date: --    JARDIANCE 10 MG TABLET    Take 10 mg by mouth once daily.       Start Date: 3/16/2022 End Date: --    METFORMIN (GLUCOPHAGE) 1000 MG TABLET    Take 1,000 mg by mouth 2 (two) times daily.       Start Date: 6/10/2022 End Date: --    OZEMPIC 0.25 MG OR 0.5 MG(2 MG/1.5 ML) PEN INJECTOR    Inject 0.5 mg into the skin every 7 days.       Start Date: 10/10/2022End Date: --    PRAVASTATIN (PRAVACHOL) 40 MG TABLET    Take 40 mg by mouth once daily.       Start Date: 7/11/2022 End Date: --   Changed and/or Refilled Medications    Modified Medication Previous Medication    FOLIC ACID (FOLVITE) 1 MG TABLET folic acid (FOLVITE) 1 MG tablet       Take 1 tablet (1,000 mcg total) by mouth once daily.    Take 1,000 mcg by mouth once daily.       Start Date: 11/4/2022 End Date: --    Start Date: 6/10/2022 End Date: 11/4/2022    METHOTREXATE 2.5 MG TAB methotrexate 2.5 MG Tab       Take 4 tablets (10 mg total) by mouth every 7 days. EVERY        TAKE 4 TAB TOGETHER AFTER SUPPER    Take 4 tablets (10 mg total) by mouth every 7 days. EVERY        TAKE 4 TAB TOGETHER AFTER SUPPER       Start Date: 11/4/2022 End Date: 5/3/2023    Start Date: 7/22/2022 End Date: 11/4/2022    OMEPRAZOLE (PRILOSEC) 40 MG CAPSULE omeprazole (PRILOSEC) 40 MG capsule       Take 1 capsule (40 mg total) by mouth every morning.    Take 40 mg by mouth daily as needed.       Start Date: 11/4/2022 End Date: --    Start Date: 5/11/2022 End Date: 11/4/2022    PRAMIPEXOLE (MIRAPEX) 0.25 MG TABLET pramipexole (MIRAPEX) 0.125 MG tablet       Take 1  tablet (0.25 mg total) by mouth nightly.    Take 1 tablet (0.125 mg total) by mouth nightly.       Start Date: 11/4/2022 End Date: 11/4/2023    Start Date: 7/22/2022 End Date: 11/4/2022         Plan:       Problem List Items Addressed This Visit    None  Visit Diagnoses       Seropositive rheumatoid arthritis    -  Primary    Relevant Medications    pramipexole (MIRAPEX) 0.25 MG tablet    folic acid (FOLVITE) 1 MG tablet    methotrexate 2.5 MG Tab    omeprazole (PRILOSEC) 40 MG capsule    Fibromyalgia syndrome        Relevant Medications    pramipexole (MIRAPEX) 0.25 MG tablet    folic acid (FOLVITE) 1 MG tablet    methotrexate 2.5 MG Tab    omeprazole (PRILOSEC) 40 MG capsule    Primary insomnia        Relevant Medications    pramipexole (MIRAPEX) 0.25 MG tablet    folic acid (FOLVITE) 1 MG tablet    methotrexate 2.5 MG Tab    omeprazole (PRILOSEC) 40 MG capsule    Restless legs syndrome (RLS)        Relevant Medications    pramipexole (MIRAPEX) 0.25 MG tablet    folic acid (FOLVITE) 1 MG tablet    methotrexate 2.5 MG Tab    omeprazole (PRILOSEC) 40 MG capsule

## 2023-11-28 DIAGNOSIS — M05.9 SEROPOSITIVE RHEUMATOID ARTHRITIS: Primary | ICD-10-CM

## 2025-01-30 ENCOUNTER — TELEPHONE (OUTPATIENT)
Dept: RHEUMATOLOGY | Facility: CLINIC | Age: 70
End: 2025-01-30

## 2025-01-30 ENCOUNTER — OFFICE VISIT (OUTPATIENT)
Dept: RHEUMATOLOGY | Facility: CLINIC | Age: 70
End: 2025-01-30
Payer: MEDICARE

## 2025-01-30 VITALS
HEART RATE: 87 BPM | TEMPERATURE: 98 F | OXYGEN SATURATION: 97 % | DIASTOLIC BLOOD PRESSURE: 72 MMHG | BODY MASS INDEX: 40.01 KG/M2 | HEIGHT: 60 IN | RESPIRATION RATE: 18 BRPM | SYSTOLIC BLOOD PRESSURE: 134 MMHG | WEIGHT: 203.81 LBS

## 2025-01-30 DIAGNOSIS — E66.01 MORBID OBESITY: ICD-10-CM

## 2025-01-30 DIAGNOSIS — I27.20 PULMONARY HTN: ICD-10-CM

## 2025-01-30 DIAGNOSIS — J84.9 ILD (INTERSTITIAL LUNG DISEASE): ICD-10-CM

## 2025-01-30 DIAGNOSIS — G47.33 OSA ON CPAP: ICD-10-CM

## 2025-01-30 DIAGNOSIS — E11.9 TYPE 2 DIABETES MELLITUS WITHOUT COMPLICATION, WITHOUT LONG-TERM CURRENT USE OF INSULIN: ICD-10-CM

## 2025-01-30 DIAGNOSIS — M89.50: ICD-10-CM

## 2025-01-30 DIAGNOSIS — R76.8 POSITIVE ANA (ANTINUCLEAR ANTIBODY): ICD-10-CM

## 2025-01-30 DIAGNOSIS — J44.9 CHRONIC OBSTRUCTIVE PULMONARY DISEASE, UNSPECIFIED COPD TYPE: ICD-10-CM

## 2025-01-30 DIAGNOSIS — I10 PRIMARY HYPERTENSION: ICD-10-CM

## 2025-01-30 DIAGNOSIS — R76.8 RHEUMATOID FACTOR POSITIVE: ICD-10-CM

## 2025-01-30 DIAGNOSIS — M34.9 SCLERODERMA: Primary | ICD-10-CM

## 2025-01-30 PROCEDURE — G0008 ADMIN INFLUENZA VIRUS VAC: HCPCS | Mod: PBBFAC

## 2025-01-30 PROCEDURE — 99214 OFFICE O/P EST MOD 30 MIN: CPT | Mod: PBBFAC | Performed by: INTERNAL MEDICINE

## 2025-01-30 PROCEDURE — 90653 IIV ADJUVANT VACCINE IM: CPT | Mod: PBBFAC

## 2025-01-30 RX ORDER — CALCIUM CITRATE/VITAMIN D3 200MG-6.25
TABLET ORAL
COMMUNITY
Start: 2024-11-27

## 2025-01-30 RX ORDER — ASPIRIN 325 MG
50000 TABLET, DELAYED RELEASE (ENTERIC COATED) ORAL
COMMUNITY
Start: 2024-12-02

## 2025-01-30 RX ORDER — EZETIMIBE 10 MG/1
10 TABLET ORAL
COMMUNITY
Start: 2024-12-30

## 2025-01-30 RX ORDER — FLUTICASONE FUROATE, UMECLIDINIUM BROMIDE AND VILANTEROL TRIFENATATE 100; 62.5; 25 UG/1; UG/1; UG/1
1 POWDER RESPIRATORY (INHALATION)
COMMUNITY
Start: 2024-11-07

## 2025-01-30 RX ADMIN — INFLUENZA A VIRUS A/VICTORIA/4897/2022 IVR-238 (H1N1) ANTIGEN (FORMALDEHYDE INACTIVATED), INFLUENZA A VIRUS A/THAILAND/8/2022 IVR-237 (H3N2) ANTIGEN (FORMALDEHYDE INACTIVATED), INFLUENZA B VIRUS B/AUSTRIA/1359417/2021 BVR-26 ANTIGEN (FORMALDEHYDE INACTIVATED) 0.5 ML: 15; 15; 15 INJECTION, SUSPENSION INTRAMUSCULAR at 09:01

## 2025-01-30 NOTE — TELEPHONE ENCOUNTER
Reviewed previous records.     CT chest in the past showed mild inflammation in the lungs along with slightly increased pressures on right side of the heart.    Ordered basic labs, repeat CT chest, PFTs and echocardiogram to complete workup.  Please ask her to do them as soon as she can.

## 2025-01-30 NOTE — TELEPHONE ENCOUNTER
Request Recent Echocardiogram and progress note from Dr.Brent Burnham.     Also patient will be having a stress test done soon on 02/03/2025. Request results second or third week of February 2025.

## 2025-01-30 NOTE — PROGRESS NOTES
Patient ID: [id number]     Chief Complaint: Establish Care (Patient states she is doing well overall. Minor joint pain denies any recent or frequent flares. )      Referred By: No ref. provider found     HPI:     Yosvany Song is a 69 y.o. female here today for a new patient visit.      Patient is a 69yr old female with PMH of seropositive RA, DIABETES TYPE 2, Htn, HX of lumpectomy of left breat(benign), Asthma/CPAP (on intermittent home O2), IAM on CPAP came to the clinic to establish care.  Patient was followed by Dr Holt, and was seeing Dr Ames before him in [place], was on methotrexate 10mg, has not been on the methotrexate since a year. Was told to the patient that she has scleroderma by Dr. Ames.   Today, patient says that she has been feeling good recently. Denies any episodes of low grade fevers, rashes, morning stiffness, myalgias, joint pains, red, swollen , inflamed joints. Complains of pain in the right ankle and foot and right wrist. Reports that it has been going on since she fixed her ceiling fan yesterday. Denies any aggravating or relieving factors. No redness or swelling present.     Denies history of photosensitivity, oral or nasal ulcers, h/o MI, stroke, seizures, h/o PE or DVT, Raynaud's phenomenon, uveitis, malignancies.     Follows with ophthalmology annually, cardiologist, pulmonologist and primary care is established in [place].     Family history of autoimmune disease: None    Pregnancies: 2  Miscarriages: 1 (details not known)    Surgical history : breast lumpectomy (2022) , hernia repair surgery (around 6 yrs ago)    Smoking: never. (Passive smoking from )    Social History     Tobacco Use   Smoking Status Never   Smokeless Tobacco Never        Past medical history  -------------------------------------    COPD (chronic obstructive pulmonary disease)    COPD (chronic obstructive pulmonary disease)    Rheumatoid arthritis, unspecified    Type 2 diabetes mellitus without  complications        Past Surgical History:   Procedure Laterality Date    BREAST SURGERY         Review of patient's allergies indicates:   Allergen Reactions    Azithromycin Itching and Swelling     Other reaction(s): rash    Levofloxacin Itching and Swelling     Other reaction(s): rash    Shellfish containing products Swelling       Outpatient Medications Marked as Taking for the 1/30/25 encounter (Office Visit) with Aries Ames MD   Medication Sig Dispense Refill    albuterol (PROVENTIL/VENTOLIN HFA) 90 mcg/actuation inhaler Inhale 2 puffs into the lungs every 6 (six) hours as needed.      alendronate (FOSAMAX) 70 MG tablet Take 70 mg by mouth every 7 days.      amlodipine-benazepril 5-20 mg (LOTREL) 5-20 mg per capsule Take 2 capsules by mouth once daily.      budesonide-formoterol 160-4.5 mcg (SYMBICORT) 160-4.5 mcg/actuation HFAA Inhale 2 puffs into the lungs daily as needed.      cholecalciferol, vitamin D3, 1,250 mcg (50,000 unit) capsule Take 50,000 Units by mouth every 7 days.      ezetimibe (ZETIA) 10 mg tablet Take 10 mg by mouth.      folic acid (FOLVITE) 1 MG tablet Take 1 tablet (1,000 mcg total) by mouth once daily. 30 tablet 5    JANUVIA 100 mg Tab Take 100 mg by mouth once daily.      OZEMPIC 0.25 mg or 0.5 mg(2 mg/1.5 mL) pen injector Inject 0.5 mg into the skin every 7 days.      TRELEGY ELLIPTA 100-62.5-25 mcg DsDv Take 1 puff by mouth.      TRUE METRIX GLUCOSE TEST STRIP Strp SMARTSIG:Via Meter Daily       Current Facility-Administered Medications for the 1/30/25 encounter (Office Visit) with Aries Ames MD   Medication Dose Route Frequency Provider Last Rate Last Admin    [COMPLETED] influenza (adjuvanted) (Fluad) 45 mcg/0.5 mL IM vaccine (> or = 66 yo) 0.5 mL  0.5 mL Intramuscular 1 time in Clinic/HOD    0.5 mL at 01/30/25 0936       Social History     Socioeconomic History    Marital status:    Tobacco Use    Smoking status: Never    Smokeless tobacco: Never   Substance and  Sexual Activity    Alcohol use: Not Currently    Drug use: Never        Family History   Problem Relation Name Age of Onset    Kidney disease Mother      Heart disease Father      Heart disease Sister          Immunization History   Administered Date(s) Administered    COVID-19, MRNA, LN-S, PF (Pfizer) (Gray Cap) 06/21/2022    COVID-19, MRNA, LN-S, PF (Pfizer) (Purple Cap) 02/25/2021, 03/19/2021, 10/02/2021    COVID-19, mRNA, LNP-S, PF (Moderna) Ages 12+ 10/04/2023    COVID-19, mRNA, LNP-S, bivalent booster, PF (PFIZER OMICRON) 09/30/2022    Influenza - Quadrivalent - High Dose - PF (65 years and older) 10/04/2023    Influenza - Quadrivalent - PF *Preferred* (6 months and older) 09/26/2017, 10/13/2020, 09/21/2022    Influenza - Trivalent - Fluad - Adjuvanted - PF (65 years and older 01/30/2025    Pneumococcal Conjugate - 20 Valent 05/01/2023    RSVpreF (Arexvy) 11/08/2023    Tdap 05/01/2023    Zoster Recombinant 10/06/2022, 05/01/2023       Patient Care Team:  Debora Javed APRN as PCP - General (Family Medicine)     Subjective:     Constitutional:  Denies chills. Denies fever. Denies night sweats. Denies weight loss.   Ophthalmology: Denies blurred vision. Denies dry eyes. Denies eye pain. Denies Itching and redness.   ENT: Denies oral ulcers. Denies epistaxis. Denies dry mouth. Denies swollen glands.   Endocrine: Admits diabetes. Denies thyroid Problems.   Respiratory: Denies cough. Admits shortness of breath. Admits shortness of breath with exertion. Denies hemoptysis.   Cardiovascular: Denies chest pain at rest. Denies chest pain with exertion. Denies palpitations.    Gastrointestinal: Denies abdominal pain. Denies diarrhea. Denies nausea. Denies vomiting. Denies hematemesis or hematochezia. Admits heartburn.  Genitourinary: Denies blood in urine.  Musculoskeletal: See HPI for details  Integumentary: Denies rash. Denies photosensitivity.   Peripheral Vascular: Denies Ulcers of hands and/or feet. Denies  Cold extremities.   Neurologic: Denies dizziness. Denies headache.  Denies loss of strength. Denies numbness or tingling.   Psychiatric: Denies depression. Denies anxiety. Denies suicidal/homicidal ideations.      Objective:     /72 (BP Location: Left arm, Patient Position: Sitting)   Pulse 87   Temp 97.8 °F (36.6 °C) (Oral)   Resp 18   Ht 5' (1.524 m)   Wt 92.4 kg (203 lb 12.8 oz)   SpO2 97%   BMI 39.80 kg/m²     Physical Exam    General Appearance: alert, pleasant, in no acute distress.  Skin: Skin color, texture, turgor normal. No rashes or lesions.  Eyes:  extraocular movement intact (EOMI), pupils equal, round, reactive to light and accommodation, conjunctiva clear.  ENT: No oral or nasal ulcers.  Neck:  Neck supple. No adenopathy.   Lungs: CTA throughout without crackles, rhonchi, or wheezes.   Heart: RRR w/o murmurs.  No edema. 2+ palpable DP/TP pulse.  Abdomen: Soft, non-tender, no masses, rebound or guarding.  Neuro: Alert, oriented, CN II-XII GI, sensory and motor innervation intact.  Musculoskeletal:   Psych: Alert, oriented, normal eye contact.    Joint Exam:  DIPs, PIPs, MCPs: no pain on palpation, no swelling or redness. Second distal phalanx was short bilaterally. A heberden node present on the right middle finger.  Wrists: no pain on palpation, no swelling or redness, good range of motion.  Elbows: no pain on palpation, no swelling or redness, good range of motion, no nodules.  Shoulders: no pain on palpation, no swelling or redness, good range of motion.  Back/Neck: no pain on palpation.   Hips: no pain on palpation, good range of motion.  Knees: no pain on palpation, no swelling or redness, good range of motion.  Ankles: no pain on palpation, no swelling or redness, good range of motion.  Feet: no pain on palpation, no swelling or redness, no nodules.    Labs:     7/22/24: VIKRAM 1:640 speckled. . CCP equivocal.       Imaging:   None    Assessment:       ICD-10-CM ICD-9-CM   1.  Rheumatoid factor positive  R76.8 795.79   2. Positive VIKRAM (antinuclear antibody)  R76.8 795.79   3. IAM on CPAP  G47.33 327.23   4. Type 2 diabetes mellitus without complication, without long-term current use of insulin  E11.9 250.00   5. Primary hypertension  I10 401.9   6. Chronic obstructive pulmonary disease, unspecified COPD type  J44.9 496        Plan:     1. Rheumatoid factor positive :   Patient referred here for previous RF positive titre with Quantitative 199 (2022). Was dx 'ed as rheumatoid arthritis previously and treated with methotrexate 10mg until a year ago . Anti CCP antibodies at the time were equivocal.  - Patient asymptomatic today without any flares. Will repeat blood work today and request documentation from previous clinics.      2. Positive VIKRAM (antinuclear antibody)   - Most recent noted documentation from 2022 showed VIKRAM -1:640. Denies any symptoms today. HbCab, Hbsag were negative before . Will continue to monitor as pt clinically stable.     3. IAM on CPAP  - Patient on CPAP at home Qhs. Follows up with pulmonology. IS compliant with CPAP per patient. Car eper pulm.     4. Type 2 diabetes mellitus without complication, without long-term current use of insulin   - Patient reports stable HbA1c. Is on januvia, ozempic at home. Care per primary care physician.     5. Primary hypertension   BP at goal today. IS on Lotrel and uses Zetia for cholesterol. Follows with a primary care physician and cardiology. Will continue to monitor, care per cardiology      6. Chronic obstructive pulmonary disease, unspecified COPD type   Is on home oxygen 5 lit. Intermittent use of oxygen per patient. No h/o of active smoking. Follows with pulmonology, care per them.             Follow up in about 4 months (around 5/30/2025). In addition to their scheduled follow up, the patient has also been instructed to follow up on as needed basis.        Total time spent with patient and documentation is 55 minutes. All  questions were answered to patient's satisfaction and patient verbalized understanding.

## 2025-01-31 NOTE — TELEPHONE ENCOUNTER
Pt notified and verbalized understanding. Notified patient that CT chest, Echocardiogram and PFT's will have to be scheduled via Centralized scheduling so to expect a call to have them scheduled.

## 2025-02-05 ENCOUNTER — LAB VISIT (OUTPATIENT)
Dept: LAB | Facility: HOSPITAL | Age: 70
End: 2025-02-05
Attending: INTERNAL MEDICINE
Payer: MEDICARE

## 2025-02-05 DIAGNOSIS — R76.8 RHEUMATOID FACTOR POSITIVE: ICD-10-CM

## 2025-02-05 DIAGNOSIS — J84.9 ILD (INTERSTITIAL LUNG DISEASE): ICD-10-CM

## 2025-02-05 DIAGNOSIS — M34.9 SCLERODERMA: ICD-10-CM

## 2025-02-05 DIAGNOSIS — R76.8 POSITIVE ANA (ANTINUCLEAR ANTIBODY): ICD-10-CM

## 2025-02-05 LAB
ALBUMIN SERPL-MCNC: 3.8 G/DL (ref 3.4–4.8)
ALBUMIN/GLOB SERPL: 0.8 RATIO (ref 1.1–2)
ALP SERPL-CCNC: 51 UNIT/L (ref 40–150)
ALT SERPL-CCNC: 16 UNIT/L (ref 0–55)
ANION GAP SERPL CALC-SCNC: 8 MEQ/L
AST SERPL-CCNC: 18 UNIT/L (ref 5–34)
BACTERIA #/AREA URNS AUTO: ABNORMAL /HPF
BASOPHILS # BLD AUTO: 0.08 X10(3)/MCL
BASOPHILS NFR BLD AUTO: 0.9 %
BILIRUB SERPL-MCNC: 0.6 MG/DL
BILIRUB UR QL STRIP.AUTO: NEGATIVE
BUN SERPL-MCNC: 10.5 MG/DL (ref 9.8–20.1)
C3 SERPL-MCNC: 135 MG/DL (ref 80–173)
C4 SERPL-MCNC: 23 MG/DL (ref 13–46)
CALCIUM SERPL-MCNC: 9.3 MG/DL (ref 8.4–10.2)
CHLORIDE SERPL-SCNC: 105 MMOL/L (ref 98–107)
CLARITY UR: CLEAR
CO2 SERPL-SCNC: 27 MMOL/L (ref 23–31)
COLOR UR AUTO: ABNORMAL
CREAT SERPL-MCNC: 0.88 MG/DL (ref 0.55–1.02)
CREAT UR-MCNC: 107.9 MG/DL (ref 45–106)
CREAT/UREA NIT SERPL: 12
CRP SERPL-MCNC: 20.3 MG/L
EOSINOPHIL # BLD AUTO: 0.33 X10(3)/MCL (ref 0–0.9)
EOSINOPHIL NFR BLD AUTO: 3.9 %
ERYTHROCYTE [DISTWIDTH] IN BLOOD BY AUTOMATED COUNT: 14.4 % (ref 11.5–17)
ERYTHROCYTE [SEDIMENTATION RATE] IN BLOOD: 40 MM/HR (ref 0–20)
GFR SERPLBLD CREATININE-BSD FMLA CKD-EPI: >60 ML/MIN/1.73/M2
GLOBULIN SER-MCNC: 4.6 GM/DL (ref 2.4–3.5)
GLUCOSE SERPL-MCNC: 106 MG/DL (ref 82–115)
GLUCOSE UR QL STRIP: NORMAL
HCT VFR BLD AUTO: 36.9 % (ref 37–47)
HGB BLD-MCNC: 11.9 G/DL (ref 12–16)
HGB UR QL STRIP: NEGATIVE
HYALINE CASTS #/AREA URNS LPF: ABNORMAL /LPF
IMM GRANULOCYTES # BLD AUTO: 0.02 X10(3)/MCL (ref 0–0.04)
IMM GRANULOCYTES NFR BLD AUTO: 0.2 %
KETONES UR QL STRIP: NEGATIVE
LEUKOCYTE ESTERASE UR QL STRIP: NEGATIVE
LYMPHOCYTES # BLD AUTO: 1.97 X10(3)/MCL (ref 0.6–4.6)
LYMPHOCYTES NFR BLD AUTO: 23 %
MCH RBC QN AUTO: 27.4 PG (ref 27–31)
MCHC RBC AUTO-ENTMCNC: 32.2 G/DL (ref 33–36)
MCV RBC AUTO: 84.8 FL (ref 80–94)
MONOCYTES # BLD AUTO: 0.66 X10(3)/MCL (ref 0.1–1.3)
MONOCYTES NFR BLD AUTO: 7.7 %
MUCOUS THREADS URNS QL MICRO: ABNORMAL /LPF
NEUTROPHILS # BLD AUTO: 5.51 X10(3)/MCL (ref 2.1–9.2)
NEUTROPHILS NFR BLD AUTO: 64.3 %
NITRITE UR QL STRIP: NEGATIVE
NRBC BLD AUTO-RTO: 0 %
PH UR STRIP: 6.5 [PH]
PLATELET # BLD AUTO: 362 X10(3)/MCL (ref 130–400)
PMV BLD AUTO: 10.8 FL (ref 7.4–10.4)
POTASSIUM SERPL-SCNC: 4.3 MMOL/L (ref 3.5–5.1)
PROT SERPL-MCNC: 8.4 GM/DL (ref 5.8–7.6)
PROT UR QL STRIP: NEGATIVE
PROT UR STRIP-MCNC: 16.3 MG/DL
RBC # BLD AUTO: 4.35 X10(6)/MCL (ref 4.2–5.4)
RBC #/AREA URNS AUTO: ABNORMAL /HPF
SODIUM SERPL-SCNC: 140 MMOL/L (ref 136–145)
SP GR UR STRIP.AUTO: 1.02 (ref 1–1.03)
SQUAMOUS #/AREA URNS LPF: ABNORMAL /HPF
URINE PROTEIN/CREATININE RATIO (OLG): 0.2
UROBILINOGEN UR STRIP-ACNC: NORMAL
WBC # BLD AUTO: 8.57 X10(3)/MCL (ref 4.5–11.5)
WBC #/AREA URNS AUTO: ABNORMAL /HPF

## 2025-02-05 PROCEDURE — 85652 RBC SED RATE AUTOMATED: CPT

## 2025-02-05 PROCEDURE — 36415 COLL VENOUS BLD VENIPUNCTURE: CPT

## 2025-02-05 PROCEDURE — 80053 COMPREHEN METABOLIC PANEL: CPT

## 2025-02-05 PROCEDURE — 86160 COMPLEMENT ANTIGEN: CPT

## 2025-02-05 PROCEDURE — 85025 COMPLETE CBC W/AUTO DIFF WBC: CPT

## 2025-02-05 PROCEDURE — 86140 C-REACTIVE PROTEIN: CPT

## 2025-02-05 PROCEDURE — 83516 IMMUNOASSAY NONANTIBODY: CPT

## 2025-02-05 PROCEDURE — 81001 URINALYSIS AUTO W/SCOPE: CPT

## 2025-02-05 PROCEDURE — 84156 ASSAY OF PROTEIN URINE: CPT

## 2025-02-06 LAB — MAYO GENERIC ORDERABLE RESULT: ABNORMAL

## 2025-03-12 ENCOUNTER — HOSPITAL ENCOUNTER (OUTPATIENT)
Dept: CARDIOLOGY | Facility: HOSPITAL | Age: 70
Discharge: HOME | End: 2025-03-12
Attending: INTERNAL MEDICINE
Payer: MEDICARE

## 2025-03-12 ENCOUNTER — HOSPITAL ENCOUNTER (OUTPATIENT)
Dept: PULMONOLOGY | Facility: HOSPITAL | Age: 70
Discharge: HOME | End: 2025-03-12
Attending: INTERNAL MEDICINE
Payer: MEDICARE

## 2025-03-12 ENCOUNTER — HOSPITAL ENCOUNTER (OUTPATIENT)
Dept: RADIOLOGY | Facility: HOSPITAL | Age: 70
Discharge: HOME | End: 2025-03-12
Attending: INTERNAL MEDICINE
Payer: MEDICARE

## 2025-03-12 VITALS
WEIGHT: 203 LBS | HEIGHT: 60 IN | SYSTOLIC BLOOD PRESSURE: 152 MMHG | DIASTOLIC BLOOD PRESSURE: 70 MMHG | BODY MASS INDEX: 39.85 KG/M2

## 2025-03-12 DIAGNOSIS — R76.8 RHEUMATOID FACTOR POSITIVE: ICD-10-CM

## 2025-03-12 DIAGNOSIS — R76.8 POSITIVE ANA (ANTINUCLEAR ANTIBODY): ICD-10-CM

## 2025-03-12 DIAGNOSIS — M34.9 SCLERODERMA: ICD-10-CM

## 2025-03-12 DIAGNOSIS — J84.9 ILD (INTERSTITIAL LUNG DISEASE): ICD-10-CM

## 2025-03-12 PROCEDURE — 71250 CT THORAX DX C-: CPT | Mod: TC

## 2025-03-12 PROCEDURE — 94060 EVALUATION OF WHEEZING: CPT

## 2025-03-12 PROCEDURE — 94640 AIRWAY INHALATION TREATMENT: CPT | Mod: XB

## 2025-03-12 PROCEDURE — 93306 TTE W/DOPPLER COMPLETE: CPT

## 2025-03-12 PROCEDURE — 94729 DIFFUSING CAPACITY: CPT

## 2025-03-12 PROCEDURE — 94726 PLETHYSMOGRAPHY LUNG VOLUMES: CPT

## 2025-03-12 RX ORDER — ALBUTEROL SULFATE 0.83 MG/ML
2.5 SOLUTION RESPIRATORY (INHALATION) EVERY 4 HOURS PRN
Status: DISPENSED | OUTPATIENT
Start: 2025-03-12

## 2025-03-12 RX ADMIN — ALBUTEROL SULFATE 2.5 MG: 0.83 SOLUTION RESPIRATORY (INHALATION) at 12:03

## 2025-03-12 NOTE — PROGRESS NOTES
Good cooperation and effort given. Albuterol 2.5 mg given HR 88/89, SAT 97%, BBS diminished  PFT completed

## 2025-03-13 LAB
APICAL FOUR CHAMBER EJECTION FRACTION: 58 %
APICAL TWO CHAMBER EJECTION FRACTION: 64 %
AV INDEX (PROSTH): 0.59
AV MEAN GRADIENT: 9 MMHG
AV PEAK GRADIENT: 16 MMHG
AV REGURGITATION PRESSURE HALF TIME: 442 MS
AV VALVE AREA BY VELOCITY RATIO: 2 CM²
AV VALVE AREA: 1.9 CM²
AV VELOCITY RATIO: 0.65
BSA FOR ECHO PROCEDURE: 1.97 M2
CV ECHO LV RWT: 0.51 CM
DLCO SINGLE BREATH LLN: 13.51
DLCO SINGLE BREATH PRE REF: 81.5 %
DLCO SINGLE BREATH REF: 19.24
DLCOC SBVA LLN: 2.8
DLCOC SBVA REF: 4.51
DLCOC SINGLE BREATH LLN: 13.51
DLCOC SINGLE BREATH REF: 19.24
DLCOVA LLN: 2.8
DLCOVA PRE REF: 85.6 %
DLCOVA PRE: 3.86 ML/(MIN*MMHG*L) (ref 2.8–6.21)
DLCOVA REF: 4.51
DOP CALC AO PEAK VEL: 2 M/S
DOP CALC AO VTI: 43.3 CM
DOP CALC LVOT AREA: 3.1 CM2
DOP CALC LVOT DIAMETER: 2 CM
DOP CALC LVOT PEAK VEL: 1.3 M/S
DOP CALC LVOT STROKE VOLUME: 80.7 CM3
DOP CALC MV VTI: 42.7 CM
DOP CALCLVOT PEAK VEL VTI: 25.7 CM
E WAVE DECELERATION TIME: 173 MSEC
E/A RATIO: 0.96
ECHO LV POSTERIOR WALL: 1.2 CM (ref 0.6–1.1)
ERV LLN: -16449.35
ERV PRE REF: 73.5 %
ERV REF: 0.65
FEF 25 75 CHG: -1.3 %
FEF 25 75 LLN: 1.15
FEF 25 75 POST REF: 24.9 %
FEF 25 75 PRE REF: 25.2 %
FEF 25 75 REF: 2.55
FET100 CHG: -4.8 %
FEV1 CHG: -0.3 %
FEV1 FVC CHG: -3.9 %
FEV1 FVC LLN: 67
FEV1 FVC POST REF: 88.6 %
FEV1 FVC PRE REF: 92.1 %
FEV1 FVC REF: 79
FEV1 LLN: 1.21
FEV1 POST REF: 60.3 %
FEV1 PRE REF: 60.4 %
FEV1 REF: 1.72
FRACTIONAL SHORTENING: 31.9 % (ref 28–44)
FRCPLETH LLN: 1.66
FRCPLETH PREREF: 108.2 %
FRCPLETH REF: 2.48
FVC CHG: 3.7 %
FVC LLN: 1.55
FVC POST REF: 67.8 %
FVC PRE REF: 65.4 %
FVC REF: 2.17
HR MV ECHO: 73 BPM
INTERVENTRICULAR SEPTUM: 1 CM (ref 0.6–1.1)
IVC DIAMETER: 2 CM
IVC PRE: 1.53 L (ref 1.55–2.82)
IVC SINGLE BREATH LLN: 1.55
IVC SINGLE BREATH PRE REF: 70.6 %
IVC SINGLE BREATH REF: 2.17
LEFT ATRIUM AREA SYSTOLIC (APICAL 2 CHAMBER): 11.21 CM2
LEFT ATRIUM AREA SYSTOLIC (APICAL 4 CHAMBER): 25.57 CM2
LEFT ATRIUM SIZE: 4.5 CM
LEFT ATRIUM VOLUME INDEX MOD: 29 ML/M2
LEFT ATRIUM VOLUME MOD: 54 ML
LEFT INTERNAL DIMENSION IN SYSTOLE: 3.2 CM (ref 2.1–4)
LEFT VENTRICLE DIASTOLIC VOLUME INDEX: 55.32 ML/M2
LEFT VENTRICLE DIASTOLIC VOLUME: 104 ML
LEFT VENTRICLE END DIASTOLIC VOLUME APICAL 2 CHAMBER: 89.32 ML
LEFT VENTRICLE END DIASTOLIC VOLUME APICAL 4 CHAMBER: 63.37 ML
LEFT VENTRICLE END SYSTOLIC VOLUME APICAL 2 CHAMBER: 24.02 ML
LEFT VENTRICLE END SYSTOLIC VOLUME APICAL 4 CHAMBER: 79.73 ML
LEFT VENTRICLE MASS INDEX: 99.8 G/M2
LEFT VENTRICLE SYSTOLIC VOLUME INDEX: 21.8 ML/M2
LEFT VENTRICLE SYSTOLIC VOLUME: 41 ML
LEFT VENTRICULAR INTERNAL DIMENSION IN DIASTOLE: 4.7 CM (ref 3.5–6)
LEFT VENTRICULAR MASS: 187.5 G
LVED V (TEICH): 104.29 ML
LVES V (TEICH): 40.99 ML
LVOT MG: 3.79 MMHG
LVOT MV: 0.92 CM/S
MV MEAN GRADIENT: 2 MMHG
MV PEAK A VEL: 1.1 M/S
MV PEAK E VEL: 1.06 M/S
MV PEAK GRADIENT: 5 MMHG
MV STENOSIS PRESSURE HALF TIME: 50.07 MS
MV VALVE AREA BY CONTINUITY EQUATION: 1.89 CM2
MV VALVE AREA P 1/2 METHOD: 4.39 CM2
MVV LLN: 59
MVV PRE REF: 70.1 %
MVV REF: 70
OHS LV EJECTION FRACTION SIMPSONS BIPLANE MOD: 62 %
PEF CHG: 6.9 %
PEF LLN: 2.7
PEF POST REF: 74.3 %
PEF PRE REF: 69.5 %
PEF REF: 4.46
PERICARDIUM ANTERIOR DIMENSION: 1.8 CM
PERICARDIUM LATERAL DIMENSION: 1.4 CM
PERICARDIUM POSTERIOR DIMENSION: 2.2 CM
PISA AR MAX VEL: 2.81 M/S
PISA MRMAX VEL: 5.81 M/S
PISA TR MAX VEL: 3.4 M/S
POST FEF 25 75: 0.63 L/S (ref 1.15–3.95)
POST FET 100: 6.09 SEC
POST FEV1 FVC: 70.33 % (ref 66.76–90.34)
POST FEV1: 1.03 L (ref 1.21–2.2)
POST FVC: 1.47 L (ref 1.55–2.82)
POST PEF: 3.31 L/S (ref 2.7–6.22)
PRE DLCO: 15.68 ML/(MIN*MMHG) (ref 13.51–24.97)
PRE ERV: 0.48 L (ref -16449.35–16450.65)
PRE FEF 25 75: 0.64 L/S (ref 1.15–3.95)
PRE FET 100: 6.4 SEC
PRE FEV1 FVC: 73.18 % (ref 66.76–90.34)
PRE FEV1: 1.04 L (ref 1.21–2.2)
PRE FRC PL: 2.68 L (ref 1.66–3.3)
PRE FVC: 1.42 L (ref 1.55–2.82)
PRE MVV: 48.9 L/MIN (ref 59.32–80.26)
PRE PEF: 3.1 L/S (ref 2.7–6.22)
PRE RV: 2.21 L (ref 1.25–2.41)
PRE TLC: 3.68 L (ref 3.28–5.26)
RA MAJOR: 4.4 CM
RA PRESSURE ESTIMATED: 3 MMHG
RAW LLN: 3.06
RAW PRE REF: 209 %
RAW PRE: 6.39 CMH2O*S/L (ref 3.06–3.06)
RAW REF: 3.06
RV LLN: 1.25
RV PRE REF: 120.5 %
RV REF: 1.83
RV TB RVSP: 6 MMHG
RVTLC LLN: 32
RVTLC PRE REF: 143.6 %
RVTLC PRE: 59.95 % (ref 32.15–51.33)
RVTLC REF: 42
TLC LLN: 3.28
TLC PRE REF: 86.2 %
TLC REF: 4.27
TR MAX PG: 45 MMHG
TRICUSPID ANNULAR PLANE SYSTOLIC EXCURSION: 1.95 CM
TV REST PULMONARY ARTERY PRESSURE: 49 MMHG
VA PRE: 4.06 L (ref 4.12–4.12)
VA SINGLE BREATH LLN: 4.12
VA SINGLE BREATH PRE REF: 98.6 %
VA SINGLE BREATH REF: 4.12
VC LLN: 1.55
VC PRE REF: 68 %
VC PRE: 1.47 L (ref 1.55–2.82)
VC REF: 2.17
Z-SCORE OF LEFT VENTRICULAR DIMENSION IN END DIASTOLE: -1.11
Z-SCORE OF LEFT VENTRICULAR DIMENSION IN END SYSTOLE: -0.09

## 2025-04-01 ENCOUNTER — TELEPHONE (OUTPATIENT)
Dept: RHEUMATOLOGY | Facility: CLINIC | Age: 70
End: 2025-04-01
Payer: MEDICARE

## 2025-04-01 NOTE — TELEPHONE ENCOUNTER
Reviewed results of all the tests.  Received results of CT chest and breathing tests.  Please schedule sooner follow-up with me to go over test results to discuss treatment options.    I can see her next Wednesday April 9th at 2:30 p.m., I have an opening at the time.

## 2025-04-01 NOTE — LETTER
April 4, 2025    Yosvany oSng  [address] [address]             [facility]  [address] [facility] [address]  Phone: [phone] Dear ,       We are writing to you because we have made multiple attempts to reach you by phone and have been unsuccessful.     Please contact the office at your earliest convenience at [phone]       Sincerely,         [facility]   Sub-Specialty

## 2025-04-04 NOTE — TELEPHONE ENCOUNTER
Have made multiple attempts to contact pt by phone and have been unsuccessful mailing patient a letter to contact the clinic at her earliest convenience.

## 2025-04-09 ENCOUNTER — TELEPHONE (OUTPATIENT)
Dept: RHEUMATOLOGY | Facility: CLINIC | Age: 70
End: 2025-04-09
Payer: MEDICARE

## 2025-04-09 ENCOUNTER — OFFICE VISIT (OUTPATIENT)
Dept: RHEUMATOLOGY | Facility: CLINIC | Age: 70
End: 2025-04-09
Payer: MEDICARE

## 2025-04-09 VITALS
SYSTOLIC BLOOD PRESSURE: 119 MMHG | RESPIRATION RATE: 20 BRPM | HEART RATE: 92 BPM | OXYGEN SATURATION: 100 % | WEIGHT: 190 LBS | DIASTOLIC BLOOD PRESSURE: 74 MMHG | HEIGHT: 61 IN | TEMPERATURE: 98 F | BODY MASS INDEX: 35.87 KG/M2

## 2025-04-09 DIAGNOSIS — E11.9 TYPE 2 DIABETES MELLITUS WITHOUT COMPLICATION, WITHOUT LONG-TERM CURRENT USE OF INSULIN: ICD-10-CM

## 2025-04-09 DIAGNOSIS — I31.39 PERICARDIAL EFFUSION: ICD-10-CM

## 2025-04-09 DIAGNOSIS — G47.33 OSA ON CPAP: ICD-10-CM

## 2025-04-09 DIAGNOSIS — Z79.899 DRUG-INDUCED IMMUNODEFICIENCY: ICD-10-CM

## 2025-04-09 DIAGNOSIS — I27.20 PULMONARY HTN: ICD-10-CM

## 2025-04-09 DIAGNOSIS — D84.821 DRUG-INDUCED IMMUNODEFICIENCY: ICD-10-CM

## 2025-04-09 DIAGNOSIS — I10 PRIMARY HYPERTENSION: ICD-10-CM

## 2025-04-09 DIAGNOSIS — M34.9 SCLERODERMA: Primary | ICD-10-CM

## 2025-04-09 DIAGNOSIS — J84.9 ILD (INTERSTITIAL LUNG DISEASE): ICD-10-CM

## 2025-04-09 DIAGNOSIS — R76.8 RHEUMATOID FACTOR POSITIVE: ICD-10-CM

## 2025-04-09 DIAGNOSIS — M89.50: ICD-10-CM

## 2025-04-09 DIAGNOSIS — I27.20 PULMONARY HTN: Primary | ICD-10-CM

## 2025-04-09 PROCEDURE — 99215 OFFICE O/P EST HI 40 MIN: CPT | Mod: S$PBB,,, | Performed by: INTERNAL MEDICINE

## 2025-04-09 PROCEDURE — 99215 OFFICE O/P EST HI 40 MIN: CPT | Mod: PBBFAC | Performed by: INTERNAL MEDICINE

## 2025-04-09 RX ORDER — BUDESONIDE, GLYCOPYRROLATE, AND FORMOTEROL FUMARATE 160; 9; 4.8 UG/1; UG/1; UG/1
AEROSOL, METERED RESPIRATORY (INHALATION)
COMMUNITY
Start: 2025-02-24

## 2025-04-09 RX ORDER — COLCHICINE 0.6 MG/1
0.6 TABLET ORAL 2 TIMES DAILY
Qty: 60 TABLET | Refills: 3 | Status: SHIPPED | OUTPATIENT
Start: 2025-04-09 | End: 2025-04-09

## 2025-04-09 RX ORDER — MYCOPHENOLATE MOFETIL 500 MG/1
TABLET ORAL
Qty: 120 TABLET | Refills: 1 | Status: SHIPPED | OUTPATIENT
Start: 2025-04-09 | End: 2025-04-09

## 2025-04-09 RX ORDER — COLCHICINE 0.6 MG/1
0.6 TABLET ORAL 2 TIMES DAILY
Qty: 60 TABLET | Refills: 3 | Status: SHIPPED | OUTPATIENT
Start: 2025-04-09

## 2025-04-09 RX ORDER — FUROSEMIDE 20 MG/1
20 TABLET ORAL
COMMUNITY
Start: 2025-04-03

## 2025-04-09 RX ORDER — MYCOPHENOLATE MOFETIL 500 MG/1
TABLET ORAL
Qty: 120 TABLET | Refills: 1 | Status: SHIPPED | OUTPATIENT
Start: 2025-04-09

## 2025-04-09 NOTE — TELEPHONE ENCOUNTER
Please send referral to pul HTN clinic in [facility], patient willing to go there for evaluation.     Let me know when she is scheduled.

## 2025-04-09 NOTE — PATIENT INSTRUCTIONS
Start colchicine 0.6 mg twice a day, today. - for fluid around heart.    After 2 days start CellCept 500 mg twice a day and 1 week later increase dose to 1000 mg twice a day. - for lung inflammation.     Will refer to cardiology in [place] for evaluation and treatment of pulmonary HTN.    Call your cardiologist Dr Burnham and update him the plan and see him sooner for fluid around the heart.      Do lab work at [facility] in second week of May- 7th, orders for labs are placed.

## 2025-04-09 NOTE — PROGRESS NOTES
Patient ID: [id number]     Chief Complaint: Follow-up (Scleroderma/Went to [facility] Thursday last week for SOB and was dx with Copd per patient report. Was given Rx for Po Diuretic and was given IV Lasix in ER and 3 nebs in ER. Was advised to fu with PCP/Cardiologist and has not called. Today she is SOB. O2 sat 100% . Niki Is Sheila Frazier.)      HPI:     Yosvany Song is a 69 y.o. female here today for follow-up after test results.    She has type 2 diabetes, hypertension, asthma/COPD, obstructive sleep apnea on CPAP.   She saw Dr. Rai she was diagnosed with RA and was treated with methotrexate, has not been on it for more than a year.  [place] [facility] I diagnosed her with scleroderma due to positive VIKRAM and high titer RNA claudette III and started her on Plaquenil.      Complaining of intermittent shortness of breath, gets these episodes every 3-4 months.  She went to ER in [facility] last week, she was told she had fluid around her heart and was discharged on Lasix.  Intermittent joint pain in right ankle and foot, does not have joint pain all day every day.  Denies red, warm and swollen joints.  No morning stiffness.   Denies any episodes of low grade fevers, rashes, morning stiffness, myalgias, joint pains, red, swollen, inflamed joints.     Pulmonologist Dr Prince.  Cardiologist Dr Manny Burnham.      Denies history of photosensitivity, oral or nasal ulcers, h/o MI, stroke, seizures, h/o PE or DVT, Raynaud's phenomenon, uveitis, malignancies.   Family history of autoimmune disease: None  Pregnancies: 2  Miscarriages: 1 (details not known)  Surgical history : breast lumpectomy (2022) , hernia repair surgery (around 6 yrs ago)  Smoking: never. (Passive smoking from )    Social History     Tobacco Use   Smoking Status Never   Smokeless Tobacco Never        Past medical history  -------------------------------------    COPD (chronic obstructive pulmonary disease)     COPD (chronic obstructive pulmonary disease)    Rheumatoid arthritis, unspecified    Type 2 diabetes mellitus without complications        Past Surgical History:   Procedure Laterality Date    BREAST SURGERY         Review of patient's allergies indicates:   Allergen Reactions    Azithromycin Itching and Swelling     Other reaction(s): rash    Levofloxacin Itching and Swelling     Other reaction(s): rash    Shellfish containing products Swelling       Outpatient Medications Marked as Taking for the 4/9/25 encounter (Office Visit) with Aries Ames MD   Medication Sig Dispense Refill    albuterol (PROVENTIL/VENTOLIN HFA) 90 mcg/actuation inhaler Inhale 2 puffs into the lungs every 6 (six) hours as needed.      alendronate (FOSAMAX) 70 MG tablet Take 70 mg by mouth every 7 days.      amlodipine-benazepril 5-20 mg (LOTREL) 5-20 mg per capsule Take 2 capsules by mouth once daily.      budesonide-formoterol 160-4.5 mcg (SYMBICORT) 160-4.5 mcg/actuation HFAA Inhale 2 puffs into the lungs daily as needed.      budesonide-glycopyr-formoterol (BREZTRI AEROSPHERE) 160-9-4.8 mcg/actuation HFAA 2 puffs Inhalation Twice a day for 30 days      cholecalciferol, vitamin D3, 1,250 mcg (50,000 unit) capsule Take 50,000 Units by mouth every 7 days.      ezetimibe (ZETIA) 10 mg tablet Take 10 mg by mouth.      folic acid (FOLVITE) 1 MG tablet Take 1 tablet (1,000 mcg total) by mouth once daily. 30 tablet 5    furosemide (LASIX) 20 MG tablet Take 20 mg by mouth.      JANUVIA 100 mg Tab Take 100 mg by mouth once daily.      OZEMPIC 0.25 mg or 0.5 mg(2 mg/1.5 mL) pen injector Inject 0.5 mg into the skin every 7 days.      SITagliptin phosphate (JANUVIA) 25 MG Tab as directed Orally      TRUE METRIX GLUCOSE TEST STRIP Strp SMARTSIG:Via Meter Daily       Current Facility-Administered Medications for the 4/9/25 encounter (Office Visit) with Aries Ames MD   Medication Dose Route Frequency Provider Last Rate Last Admin    albuterol  nebulizer solution 2.5 mg  2.5 mg Nebulization Q4H PRN Aries Ames MD   2.5 mg at 03/12/25 1251       Social History     Socioeconomic History    Marital status:    Tobacco Use    Smoking status: Never    Smokeless tobacco: Never   Substance and Sexual Activity    Alcohol use: Not Currently    Drug use: Never        Family History   Problem Relation Name Age of Onset    Kidney disease Mother      Heart disease Father      Heart disease Sister          Immunization History   Administered Date(s) Administered    COVID-19, MRNA, LN-S, PF (Pfizer) (Gray Cap) 06/21/2022    COVID-19, MRNA, LN-S, PF (Pfizer) (Purple Cap) 02/25/2021, 03/19/2021, 10/02/2021    COVID-19, mRNA, LNP-S, PF (Moderna) Ages 12+ 10/04/2023    COVID-19, mRNA, LNP-S, bivalent booster, PF (PFIZER OMICRON) 09/30/2022    Influenza - Quadrivalent - High Dose - PF (65 years and older) 10/04/2023    Influenza - Quadrivalent - PF *Preferred* (6 months and older) 09/26/2017, 10/13/2020, 09/21/2022    Influenza - Trivalent - Fluad - Adjuvanted - PF (65 years and older 01/30/2025    Pneumococcal Conjugate - 20 Valent 05/01/2023    RSVpreF (Arexvy) 11/08/2023    Tdap 05/01/2023    Zoster Recombinant 10/06/2022, 05/01/2023       Patient Care Team:  Debora Javed APRN as PCP - General (Family Medicine)     Subjective:     Constitutional:  Denies chills. Denies fever. Denies night sweats. Denies weight loss.   Ophthalmology: Denies blurred vision. Denies dry eyes. Denies eye pain. Denies Itching and redness.   ENT: Denies oral ulcers. Denies epistaxis. Denies dry mouth. Denies swollen glands.   Endocrine: Admits diabetes. Denies thyroid Problems.   Respiratory: Denies cough. Admits shortness of breath. Admits shortness of breath with exertion. Denies hemoptysis.   Cardiovascular: Denies chest pain at rest. Denies chest pain with exertion. Denies palpitations.    Gastrointestinal: Denies abdominal pain. Denies diarrhea. Denies nausea. Denies  "vomiting. Denies hematemesis or hematochezia. Admits heartburn.  Genitourinary: Denies blood in urine.  Musculoskeletal: See HPI for details  Integumentary: Denies rash. Denies photosensitivity.   Peripheral Vascular: Denies Ulcers of hands and/or feet. Denies Cold extremities.   Neurologic: Denies dizziness. Denies headache.  Denies loss of strength. Denies numbness or tingling.   Psychiatric: Denies depression. Denies anxiety. Denies suicidal/homicidal ideations.      Objective:     /74 (BP Location: Left arm, Patient Position: Sitting)   Pulse 92   Temp 98.1 °F (36.7 °C) (Oral)   Resp 20   Ht 5' 1" (1.549 m)   Wt 86.2 kg (190 lb)   SpO2 100%   BMI 35.90 kg/m²     Physical Exam    General Appearance: alert, pleasant, in no acute distress.  Skin: Skin color, texture, turgor normal. No rashes or lesions.   Hyperpigmented macular rash to forehead, bridge of nose and chest with mild skin thickening.  Few dilated capillary nail folds.  No synovitis on exam.  Resorption of distal index fingers bilaterally.  No distal pitting or skin thickening of bilateral hands.  Eyes:  extraocular movement intact (EOMI), pupils equal, round, reactive to light and accommodation, conjunctiva clear.  ENT: No oral or nasal ulcers.  Neck:  Neck supple. No adenopathy.   Lungs: CTA throughout without crackles, rhonchi, or wheezes.   Heart: RRR w/o murmurs.  2+ palpable DP/TP pulse.  Abdomen: Soft, non-tender, no masses, rebound or guarding.  Neuro: Alert, oriented, CN II-XII GI, sensory and motor innervation intact.  Musculoskeletal: Second distal phalanx was short bilaterally. A heberden node present on the right middle finger.  No synovitis on exam.  Psych: Alert, oriented, normal eye contact.    Labs:     [place] [facility] [place]:  Rheumatoid factor 174.  Anti CCP weak positive 35, normal less than 20.  Positive VIKRAM 1:1280, nuclear, speckled pattern.  Lupus anticoagulant, cardiolipin and beta 2 glycoprotein negative.  RNA " polymerase 3 high titer positive, greater than 80.  Negative dsDNA, centromere, RNP, Smith, Scl 70, SSA, SSB, TPO, thyroglobulin.  ACE level low.    CT chest 2017: No PE, scattered atelectasis in bilateral lung bases.    Echocardiogram 2017: EF 61%, diastolic dysfunction.   2019: chest x-ray normal.  X-ray of left hand showed partial amputation of distal aspect of index finger, defect in distal tuft of bilateral index finger appear chronic, DJD changes in bilateral wrists, no erosions in bilateral MCPs, DJD of bilateral feet and moderate DJD of bilateral knees, no erosions.    CT chest 2019:  Mild chronic interstitial lung disease with mild bronchiectasis, no honeycombing, scattered nodes in mediastinum, prominence of pulmonary artery trunk, can not exclude pulmonary artery hypertension.    2019: PFTs:  FVC 85%, FEV1 54%, ratio 64, total lung capacity 122%, DLCO 58%, DLCO by VA 60%.    She was diagnosed with scleroderma, questionable Raynaud's, abnormal capillary nail folds, rash on chest and forehead with mild skin thickening, acral osteolysis and was treated with Plaquenil.  Discussed about risk of scleroderma renal crisis because of positive RNA polymerase 3.     7/22/24: VIKRAM 1:640 speckled. . CCP equivocal.   02/05/2025:  Upc 200 milligram/gram.  No protein and blood in urine.  ESR 40.  CMP okay.  CRP 20.  Hemoglobin 11.9.  C3, C4 okay.  RNA polymerase 3 positive, greater than 150.    PFTs 03/12/2025: FVC 65%, FEV1 60%, ratio 92, total lung capacity 86%, DLCO 81%, DLCO by VA 85%.      echo 11/2023:  EF greater than 55%, normal RVSP, mild MR/ER.  Echocardiogram 03/13/2025:  EF 62%, normal left ventricular systolic function.  Normal right ventricular systolic function.  PASP 49 mmHg.    CT chest 03/17/2025:  Bibasilar predominant subpleural interlobular septal thickening with ground-glass opacities, attenuation in basal segment of left lower lobe consistent with systemic sclerosis.  Minimal upper lobe  subpleural interlobular septal thickening.  Nodular septal thickening in right upper lobe.  Pulmonary hypertension.  Pericardial effusion.  Discussed with Radiology, mild early interstitial lung disease.    Requested and reviewed cardiology records: 12/30/24: Dr Manny Burnham: She has COPD on home o2, IAM with CPAP, DM II, HTN, HLD, MR. 11/2023: EF >55%, mild MR/AR, normal RVSP- no value given. Scheduled for PET scan, due to h/o ER. ACS was ruled out. 12/24/24: CXR showed increased pulmonary vascular and interstitial markings. CBC, CMP ok. CPK normal. Normal troponin. , normal less than 125.   Cardiology records from Dr. Manny Burnham, echo 11/2023:  EF greater than 55%, normal RVSP, mild MR/ER.  2/3/25:  PET scan normal, myocardial perfusion normal no evidence of myocardial ischemia.    Assessment:       ICD-10-CM ICD-9-CM   1. Scleroderma  M34.9 710.1   2. Pulmonary HTN  I27.20 416.8   3. ILD (interstitial lung disease)  J84.9 515   4. Primary hypertension  I10 401.9   5. IAM on CPAP  G47.33 327.23   6. Type 2 diabetes mellitus without complication, without long-term current use of insulin  E11.9 250.00   7. Rheumatoid factor positive  R76.8 795.79   8. Drug-induced immunodeficiency  D84.821 279.3    Z79.899 E947.9          Plan:     1.      2. Positive VIKRAM (antinuclear antibody)   - Most recent noted documentation from 2022 showed VIKRAM -1:640. Denies any symptoms today. HbCab, Hbsag were negative before . Will continue to monitor as pt clinically stable.     3. Car eper pulm.     4.      5. Primary hypertension   BP at goal today. IS on Lotrel and uses Zetia for cholesterol. Follows with a primary care physician and cardiology. Will continue to monitor, care per cardiology      6.         Requested and reviewed cardiology records: 12/30/24: Dr Manny Burnham: She has COPD on home o2, IAM with CPAP, DM II, HTN, HLD, MR. 11/2023: EF >55%, mild MR/AR, normal RVSP- no value given. Scheduled for PET scan, due to h/o  ER. ACS was ruled out. 12/24/24: CXR showed increased pulmonary vascular and interstitial markings. CBC, CMP ok. CPK normal. Normal troponin. , normal less than 125.     1. Scleroderma:  Positive VIKRAM and RNA polymerase 3- high titer.  Acral osteolysis of bilateral index fingers and few dilated capillary nail folds.  Mild ILD on CT chest in 2019, repeat CT chest in March 2025 showed worsening of ILD, bibasilar interlobular septal thickening with ground-glass opacities, right upper lobe, pulmonary hypertension in pericardial effusion.  PFTs showed decline in FVC from 85 to 65% in March 2025.  Echocardiogram in March 2025 showed PASP is elevated 49 mmHg.  - discussed the disease course and treatment options of scleroderma with the patient.  - for interstitial lung disease I will start her on CellCept.  Start CellCept 500 mg b.i.d. and after 1 week increase dose to 1000 mg b.i.d..  Discussed the risks and benefits of medication with the patient.  Labs in 4 weeks after starting CellCept.  - pulmonary hypertension:  Referred to Pulmonary hypertension Clinic in [place] for further evaluation and treatment.  Currently denies symptoms of orthopnea.  Shortness of breath with exertion.    - educated patient to try to avoid longstanding high-dose steroids due to increased risk of scleroderma renal crisis with RNA polymerase 3.  Patient verbalized understanding.    2. Pericardial effusion  Start colchicine 0.6 mg b.i.d..  Discussed the risks and benefits of medication with the patient.  - she is on Lasix 20 mg daily, prescribed after ER visit last week.  Advised to call her cardiologist as soon as possible and see them for further evaluation and treatment.  - requested and reviewed cardiology records. 12/30/24: Dr Manny Burnham: She has COPD on home o2, IAM with CPAP, DM II, HTN, HLD, MR. 11/2023: EF >55%, mild MR/AR, normal RVSP- no value given. Scheduled for PET scan, due to h/o ER. ACS was ruled out. 12/24/24: CXR  showed increased pulmonary vascular and interstitial markings. CBC, CMP ok. CPK normal. Normal troponin. , normal less than 125. 2/3/25:  PET scan normal, myocardial perfusion normal no evidence of myocardial ischemia.   - echocardiogram in March 2025 showed PASP 49 mmHg.    3. Positive rheumatoid factor, she does not have signs and symptoms of rheumatoid arthritis.  No synovitis on exam. Rheumatoid factor 174.  Anti CCP weak positive 35, normal less than 20.  Osteoarthritis in bilateral knees and in other joints.     4. History of miscarriage in 1st trimester, antiphospholipid panel negative in the past.    5. IAM on CPAP: Patient on CPAP at home. Follows up with pulmonology. IS compliant with CPAP per patient.     6. Chronic obstructive pulmonary disease: Intermittent use of oxygen per patient. No h/o of active smoking. Follows with pulmonology, continue follow up with Dr Prince.     7. Primary hypertension: BP at goal today. Advised to stay compliant with medications.     8. Type 2 diabetes mellitus: Care per primary care physician.    9. Drug-induced immunodeficiency: Persons with rheumatoid arthritis, lupus, psoriatic arthritis and other autoimmune diseases are at increased risk of cardiovascular disease including heart attack and stroke. We recommend that all patients with these conditions have annual health maintenance exams including lipid measurements, blood pressure measurements, and smoking cessation counseling when applicable at their primary care provider's office.   Advised to stay up-to-date on age appropriate vaccinations and malignancy screening, including yearly skin exams.        Follow up in about 5 months (around 9/9/2025). In addition to their scheduled follow up, the patient has also been instructed to follow up on as needed basis.      Total time spent with patient and documentation is 55 minutes. All questions were answered to patient's satisfaction and patient verbalized  understanding.

## 2025-04-14 ENCOUNTER — TELEPHONE (OUTPATIENT)
Dept: TRANSPLANT | Facility: CLINIC | Age: 70
End: 2025-04-14
Payer: MEDICARE

## 2025-04-14 DIAGNOSIS — R06.82 TACHYPNEA: ICD-10-CM

## 2025-04-14 DIAGNOSIS — I27.9 CHRONIC PULMONARY HEART DISEASE: ICD-10-CM

## 2025-04-14 DIAGNOSIS — Z79.899 POLYPHARMACY: Primary | ICD-10-CM

## 2025-04-21 ENCOUNTER — TELEPHONE (OUTPATIENT)
Dept: RHEUMATOLOGY | Facility: CLINIC | Age: 70
End: 2025-04-21
Payer: MEDICARE

## 2025-04-21 NOTE — TELEPHONE ENCOUNTER
----- Message from Sherice sent at 4/21/2025 10:04 AM CDT -----  Regarding: mycophenolate  Pt called stating that since Dr Ames increased the mycophenolate (CELLCEPT) 500 mg Tab she has been catching headaches. Pt states that she went back to taking 1 in morning and 1 at night. Please advisePt #  [phone]

## 2025-04-23 ENCOUNTER — TELEPHONE (OUTPATIENT)
Dept: TRANSPLANT | Facility: CLINIC | Age: 70
End: 2025-04-23
Payer: MEDICARE

## 2025-04-23 NOTE — TELEPHONE ENCOUNTER
Called Patient to remind him//her of their appointment with Dr Hess on Friday 4/25/25 with/without tests. Patient confirmed.

## 2025-04-25 ENCOUNTER — OFFICE VISIT (OUTPATIENT)
Dept: TRANSPLANT | Facility: CLINIC | Age: 70
End: 2025-04-25
Payer: MEDICARE

## 2025-04-25 ENCOUNTER — HOSPITAL ENCOUNTER (OUTPATIENT)
Facility: HOSPITAL | Age: 70
Discharge: HOME | End: 2025-04-25
Attending: INTERNAL MEDICINE | Admitting: INTERNAL MEDICINE
Payer: MEDICARE

## 2025-04-25 ENCOUNTER — TELEPHONE (OUTPATIENT)
Dept: TRANSPLANT | Facility: CLINIC | Age: 70
End: 2025-04-25

## 2025-04-25 VITALS
TEMPERATURE: 98 F | HEART RATE: 96 BPM | BODY MASS INDEX: 35.49 KG/M2 | OXYGEN SATURATION: 97 % | RESPIRATION RATE: 18 BRPM | DIASTOLIC BLOOD PRESSURE: 80 MMHG | HEIGHT: 62 IN | WEIGHT: 192.88 LBS | SYSTOLIC BLOOD PRESSURE: 149 MMHG

## 2025-04-25 VITALS
HEIGHT: 62 IN | OXYGEN SATURATION: 93 % | HEART RATE: 112 BPM | BODY MASS INDEX: 35.49 KG/M2 | DIASTOLIC BLOOD PRESSURE: 80 MMHG | WEIGHT: 192.88 LBS | SYSTOLIC BLOOD PRESSURE: 166 MMHG

## 2025-04-25 DIAGNOSIS — M34.9 SCLERODERMA: Primary | ICD-10-CM

## 2025-04-25 DIAGNOSIS — I27.20 PULMONARY HYPERTENSION: ICD-10-CM

## 2025-04-25 DIAGNOSIS — E11.9 TYPE 2 DIABETES MELLITUS WITHOUT COMPLICATION, WITHOUT LONG-TERM CURRENT USE OF INSULIN: ICD-10-CM

## 2025-04-25 DIAGNOSIS — J44.9 CHRONIC OBSTRUCTIVE PULMONARY DISEASE, UNSPECIFIED COPD TYPE: ICD-10-CM

## 2025-04-25 DIAGNOSIS — I27.20 PULMONARY HTN: ICD-10-CM

## 2025-04-25 DIAGNOSIS — I27.20 CHRONIC PULMONARY HYPERTENSION: Primary | ICD-10-CM

## 2025-04-25 PROCEDURE — C1751 CATH, INF, PER/CENT/MIDLINE: HCPCS | Performed by: INTERNAL MEDICINE

## 2025-04-25 PROCEDURE — 63600175 PHARM REV CODE 636 W HCPCS: Performed by: INTERNAL MEDICINE

## 2025-04-25 PROCEDURE — 93451 RIGHT HEART CATH: CPT | Performed by: INTERNAL MEDICINE

## 2025-04-25 PROCEDURE — C1894 INTRO/SHEATH, NON-LASER: HCPCS | Performed by: INTERNAL MEDICINE

## 2025-04-25 PROCEDURE — 25000003 PHARM REV CODE 250: Performed by: INTERNAL MEDICINE

## 2025-04-25 PROCEDURE — 99214 OFFICE O/P EST MOD 30 MIN: CPT | Mod: PBBFAC | Performed by: INTERNAL MEDICINE

## 2025-04-25 PROCEDURE — 93451 RIGHT HEART CATH: CPT | Mod: 26,,, | Performed by: INTERNAL MEDICINE

## 2025-04-25 PROCEDURE — 99999 PR PBB SHADOW E&M-EST. PATIENT-LVL IV: CPT | Mod: PBBFAC,,, | Performed by: INTERNAL MEDICINE

## 2025-04-25 RX ORDER — LIDOCAINE HYDROCHLORIDE 20 MG/ML
INJECTION, SOLUTION EPIDURAL; INFILTRATION; INTRACAUDAL; PERINEURAL
Status: DISCONTINUED | OUTPATIENT
Start: 2025-04-25 | End: 2025-04-25 | Stop reason: HOSPADM

## 2025-04-25 RX ORDER — TADALAFIL 20 MG/1
20 TABLET ORAL DAILY
COMMUNITY
End: 2025-04-25 | Stop reason: SDUPTHER

## 2025-04-25 RX ORDER — POTASSIUM CHLORIDE 20 MEQ/1
60 TABLET, EXTENDED RELEASE ORAL ONCE
Status: COMPLETED | OUTPATIENT
Start: 2025-04-25 | End: 2025-04-25

## 2025-04-25 RX ORDER — TADALAFIL 20 MG/1
20 TABLET ORAL DAILY
Qty: 60 TABLET | Refills: 11 | Status: SHIPPED | OUTPATIENT
Start: 2025-04-25

## 2025-04-25 RX ADMIN — POTASSIUM CHLORIDE 60 MEQ: 1500 TABLET, EXTENDED RELEASE ORAL at 01:04

## 2025-04-25 NOTE — Clinical Note
The PA catheter was repositioned to the main pulmonary artery. Hemodynamics were performed. O2 saturation was measured at 69%. CO= 5.48  CI= 2.91  THERMOS  CO= 6.74  CI= 3.58

## 2025-04-25 NOTE — Clinical Note
The PA catheter was inserted into the right atrium. Hemodynamics were performed. O2 saturation was measured at 66%.

## 2025-04-25 NOTE — TELEPHONE ENCOUNTER
Spoke with the patient in regard to the initiation of Adcirca/tadalafil. The patient was informed that adcirca is started at 20 mg for 2 weeks and then increase to 40 mg thereafter daily. The patient can take medication with or without food. The most common side effects are headache, muscle pain, flushing, nausea, pain in arms, legs, back, or upset stomach, and/or congested nose.Patient was made aware that adcirca can drop blood pressure so it is very important for her check BP before starting to make sure it is within range and to monitor BP while taking PH medication. Nitrates are contraindicated with the use of adcirca because it can drop BP too quickly and too much.     Patient was informed that prescription will be sent to specialty pharmacy and will be processed through insurance. Information pamphlet/webiste provided to patient on medication information. Patient verbalized understanding and had no further questions. Nurse coordinator is available for further questions or concerns at any time, patient verbalized understanding.      Adcirca/tadalafil prescription was sent into [facility].

## 2025-04-25 NOTE — PLAN OF CARE
Patient arrived to room.  Admit assessment completed. Plan of care discussed with patient. Sister at bedside. Nurse call bell within reach. Will monitor

## 2025-04-25 NOTE — Clinical Note
The PA catheter was repositioned to the pulmonary wedge. Hemodynamics were performed. O2 saturation was measured at 93%.

## 2025-04-25 NOTE — PLAN OF CARE
Received report from TITA Cazares. Patient s/p RHC, AAOx3. VSS, no c/o pain or discomfort at this time, resp even and unlabored. Gauze/tegaderm dressing to R IJ is CDI. No active bleeding. No hematoma noted. Discharge instructions reviewed with patient. Patient verbalizes understanding.

## 2025-04-25 NOTE — DISCHARGE INSTRUCTIONS

## 2025-04-25 NOTE — H&P (VIEW-ONLY)
Subjective:       HPI:  Ms. Song is a 69 y.o. black female with scleroderma/RA who is referred for evaluation and management of PH. Reviewing her chart, she was treated previously with plaquenil and then methotrexate but is no longer on these therapies. She was recently seen by Rheumatology and was started on cellcept 500 mg BID for ILD. Clinically reports WHO FC 2-3 symptoms. Denies any syncopal episodes. She also has IAM and uses her CPAP regularly.  At this time she denies any joint pain or swelling. Most recent Echo done last month showed preserved LV function with an EF=60-65% by my read. Mild MR. Normal RV seize and function. Had a small pericardial effusion with no echo features suggestive of tamponade physiology.       2D echo with CFD done on 3/12/2025  Left Ventricle: The left ventricle is normal in size. Mildly increased wall thickness. There is normal systolic function. Quantitated ejection fraction is 62%.    Right Ventricle: The right ventricle is normal in size. Systolic function is normal.    Left Atrium: Moderately dilated    Right Atrium: Right atrium is mildly dilated.    Aortic Valve: There is mild aortic regurgitation.    Mitral Valve: There is moderate regurgitation.    Tricuspid Valve: There is mild regurgitation.    Pulmonary Artery: The estimated pulmonary artery systolic pressure is 49 mmHg.    IVC/SVC: Normal venous pressure at 3 mmHg.    Pericardium: There is a moderate circumferential effusion. No indication of cardiac tamponade.    High resolution Chest CT done on 3/25/2025  1. Basilar predominant subpleural interlobular septal thickening with ground-glass attenuation in the basal segments of the left lower lobe are consistent with the patient's history of systemic sclerosis.  Minimal upper lobe subpleural interlobular septal thickening is also consistent with the patient's history of systemic sclerosis.  2. Nodular septal thickening in the apical segment of the right upper lobe is  nonspecific and may be postinflammatory.  3. Pulmonary hypertension.  4. Pericardial effusion.  5. Pulmonary parenchymal detail on the prior study was significantly degraded by motion artifact.  Comparison is limited.       PFTs done on 3/13/2025  Component  Ref Range & Units (hover) 1 mo ago   Post FVC 1.47 Low  P   Post FEV1 1.03 Low  P   Post FEV1 FVC 70.33 P   Post FEF 25 75 0.63 Low  P   Post PEF 3.31 P   Post  6.09 P   Pre DLCO 15.68 P   DLCOVA PRE 3.86 P   VA PRE 4.06 Low  P   IVC PRE 1.53 Low  P   Pre TLC 3.68 P   VC PRE 1.47 Low  P   Pre FRC PL 2.68 P   Pre ERV 0.48 P   Pre RV 2.21 P   RVTLC PRE 59.95 High  P   Raw PRE 6.39 High  P   Pre FVC 1.42 Low  P   Pre FEV1 1.04 Low  P   Pre FEV1 FVC 73.18 P   Pre FEF 25 75 0.64 Low  P   Pre PEF 3.10 P   Pre  6.40 P   Pre MVV 48.90 Low  P   FVC Ref 2.17 P   FVC LLN 1.55 P   FVC Pre Ref 65.4 P   FVC Post Ref 67.8 P   FVC Chg 3.7 P   FEV1 Ref 1.72 P   FEV1 LLN 1.21 P   FEV1 Pre Ref 60.4 P   FEV1 Post Ref 60.3 P   FEV1 Chg -0.3 P   FEV1 FVC Ref 79 P   FEV1 FVC LLN 67 P   FEV1 FVC Pre Ref 92.1 P   FEV1 FVC Post Ref 88.6 P   FEV1 FVC Chg -3.9 P   FEF 25 75 Ref 2.55 P   FEF 25 75 LLN 1.15 P   FEF 25 75 Pre Ref 25.2 P   FEF 25 75 Post Ref 24.9 P   FEF 25 75 Chg -1.3 P   PEF Ref 4.46 P   PEF LLN 2.70 P   PEF Pre Ref 69.5 P   PEF Post Ref 74.3 P   PEF Chg 6.9 P   [id number] Chg -4.8 P   MVV Ref 70 P   MVV LLN 59 P   MVV Pre Ref 70.1 P   TLC Ref 4.27 P   TLC LLN 3.28 P   TLC Pre Ref 86.2 P   VC Ref 2.17 P   VC LLN 1.55 P   VC Pre Ref 68.0 P   FRCpleth Ref 2.48 P   FRCpleth LLN 1.66 P   FRCpleth PreRef 108.2 P   ERV Ref 0.65 P   ERV LLN -49302.35 P   ERV Pre Ref 73.5 P   RV Ref 1.83 P   RV LLN 1.25 P   RV Pre Ref 120.5 P   RVTLC Ref 42 P   RVTLC LLN 32 P   RVTLC Pre Ref 143.6 P   Raw Ref 3.06 P   Raw LLN 3.06 P   Raw Pre Ref 209.0 P   DLCO Single Breath Ref 19.24 P   DLCO Single Breath LLN 13.51 P   DLCO Single Breath Pre Ref 81.5 P   DLCOc Single Breath Ref 19.24 P  "  DLCOc Single Breath LLN 13.51 P   DLCOVA Ref 4.51 P   DLCOVA LLN 2.80 P   DLCOVA Pre Ref 85.6 P   DLCOc SBVA Ref 4.51 P   DLCOc SBVA LLN 2.80 P   VA Single Breath Ref 4.12 P   VA Single Breath LLN 4.12 P   VA Single Breath Pre Ref 98.6 P   IVC Single Breath Ref 2.17 P   IVC Single Breath LLN 1.55 P   IVC Single Breath Pre Ref 70.6 P   Resulting Agency [facility]                    Past Medical History:   Diagnosis Date    COPD (chronic obstructive pulmonary disease)     COPD (chronic obstructive pulmonary disease)     Rheumatoid arthritis, unspecified     Type 2 diabetes mellitus without complications      Past Surgical History:   Procedure Laterality Date    BREAST SURGERY       OB History          2    Para   2    Term                AB        Living   2         SAB        IAB        Ectopic        Multiple        Live Births                   Review of Systems   Constitutional: Positive for malaise/fatigue. Negative for chills, decreased appetite, diaphoresis, fever, night sweats, weight gain and weight loss.   Eyes: Negative.    Cardiovascular:  Positive for dyspnea on exertion. Negative for chest pain, claudication, cyanosis, irregular heartbeat, leg swelling, near-syncope, orthopnea, palpitations, paroxysmal nocturnal dyspnea and syncope.   Respiratory:  Negative for cough, hemoptysis and shortness of breath.    Endocrine: Negative.    Hematologic/Lymphatic: Negative.    Skin:  Negative for color change, dry skin and nail changes.   Musculoskeletal: Negative.    Gastrointestinal: Negative.    Genitourinary: Negative.    Neurological:  Negative for weakness.       Objective:   Blood pressure (!) 166/80, pulse (!) 112, height 5' 2" (1.575 m), weight 87.5 kg (192 lb 14.4 oz), SpO2 (!) 93%.body mass index is 35.28 kg/m².  Physical Exam  Vitals and nursing note reviewed.   Constitutional:       Appearance: She is well-developed.   HENT:      Head: Normocephalic.   Eyes:      Pupils: Pupils are equal, " "round, and reactive to light.   Neck:      Vascular: No JVD.   Cardiovascular:      Rate and Rhythm: Normal rate and regular rhythm.      Chest Wall: PMI is not displaced.      Pulses: Intact distal pulses.      Heart sounds: Normal heart sounds and S2 normal. No murmur heard.     No friction rub. No gallop.   Pulmonary:      Effort: Pulmonary effort is normal.      Breath sounds: Normal breath sounds. No wheezing or rales.   Abdominal:      General: Bowel sounds are normal.      Palpations: Abdomen is soft.   Musculoskeletal:      Cervical back: Neck supple.   Neurological:      Mental Status: She is alert and oriented to person, place, and time.         Labs:    Chemistry        Component Value Date/Time     02/05/2025 0951    K 4.3 02/05/2025 0951     02/05/2025 0951    CO2 27 02/05/2025 0951    BUN 10.5 02/05/2025 0951    CREATININE 0.88 02/05/2025 0951        Component Value Date/Time    CALCIUM 9.3 02/05/2025 0951    ALKPHOS 51 02/05/2025 0951    AST 18 02/05/2025 0951    ALT 16 02/05/2025 0951    BILITOT 0.6 02/05/2025 0951    EGFRNONAA 78 (L) 09/26/2017 0340          No results found for: "MG"  Lab Results   Component Value Date    WBC 8.57 02/05/2025    HGB 11.9 (L) 02/05/2025    HCT 36.9 (L) 02/05/2025     02/05/2025     No results found for: "INR", "PROTIME"  Natriuretic Peptide   Date Value Ref Range Status   09/25/2017 <20.0 0.0 - 100.0 pg/mL Final         Assessment:      1. Scleroderma    2. Pulmonary HTN    3. Type 2 diabetes mellitus without complication, without long-term current use of insulin    4. Chronic obstructive pulmonary disease, unspecified COPD type    5. BMI 35.0-35.9,adult        Plan:   Mild PH by echo wi9th estimated PASP=49 mm of Hg with normal RV size and function. Etiology of her pH is unclear as she has MCTD (RA, scleroderma) but also COPD/IAM and HTN (ie WHO group 2/3). Recommend the following;  RHC to better assess her PA pressures and etiology.  Patient was " told that the RHC would be done today and drove from [place]. I spoke to the [facility]. We will be able to get her RHC done today. Really appreciate Dr. Perez and [facility] Team's help with getting her RHc done today.   Risks and benefits of RHC were discussed and consents were obtained and signed in clinic.   Further recs regarding next step in treatment will be determined based on her RHC findings.   Recommend 2 gram sodium restriction and 1500cc fluid restriction.  Encourage physical activity with graded exercise program.  Requested patient to weigh themselves daily, and to notify us if their weight increases by more than 3 lbs in 1 day or 5 lbs in 1 week.     Advance Care Planning     Date: 04/25/2025    Power of   I initiated the process of voluntary advance care planning today and explained the importance of this process to the patient.  I introduced the concept of advance directives to the patient, as well. Then the patient received detailed information about the importance of designating a Health Care Power of  (HCPOA). She was also instructed to communicate with this person about their wishes for future healthcare, should she become sick and lose decision-making capacity. The patient has not previously appointed a HCPOA. After our discussion, the patient has decided to complete a HCPOA and has appointed her son, health care agent: Kody Posada Jr. I encouraged her to communicate with this person about their wishes for future healthcare, should she become sick and lose decision-making capacity.     Rowan Hess MD

## 2025-04-25 NOTE — PROGRESS NOTES
Subjective:       HPI:  Ms. Song is a 69 y.o. black female with scleroderma/RA who is referred for evaluation and management of PH. Reviewing her chart, she was treated previously with plaquenil and then methotrexate but is no longer on these therapies. She was recently seen by Rheumatology and was started on cellcept 500 mg BID for ILD. Clinically reports WHO FC 2-3 symptoms. Denies any syncopal episodes. She also has IAM and uses her CPAP regularly.  At this time she denies any joint pain or swelling. Most recent Echo done last month showed preserved LV function with an EF=60-65% by my read. Mild MR. Normal RV seize and function. Had a small pericardial effusion with no echo features suggestive of tamponade physiology.       2D echo with CFD done on 3/12/2025  Left Ventricle: The left ventricle is normal in size. Mildly increased wall thickness. There is normal systolic function. Quantitated ejection fraction is 62%.    Right Ventricle: The right ventricle is normal in size. Systolic function is normal.    Left Atrium: Moderately dilated    Right Atrium: Right atrium is mildly dilated.    Aortic Valve: There is mild aortic regurgitation.    Mitral Valve: There is moderate regurgitation.    Tricuspid Valve: There is mild regurgitation.    Pulmonary Artery: The estimated pulmonary artery systolic pressure is 49 mmHg.    IVC/SVC: Normal venous pressure at 3 mmHg.    Pericardium: There is a moderate circumferential effusion. No indication of cardiac tamponade.    High resolution Chest CT done on 3/25/2025  1. Basilar predominant subpleural interlobular septal thickening with ground-glass attenuation in the basal segments of the left lower lobe are consistent with the patient's history of systemic sclerosis.  Minimal upper lobe subpleural interlobular septal thickening is also consistent with the patient's history of systemic sclerosis.  2. Nodular septal thickening in the apical segment of the right upper lobe is  nonspecific and may be postinflammatory.  3. Pulmonary hypertension.  4. Pericardial effusion.  5. Pulmonary parenchymal detail on the prior study was significantly degraded by motion artifact.  Comparison is limited.       PFTs done on 3/13/2025  Component  Ref Range & Units (hover) 1 mo ago   Post FVC 1.47 Low  P   Post FEV1 1.03 Low  P   Post FEV1 FVC 70.33 P   Post FEF 25 75 0.63 Low  P   Post PEF 3.31 P   Post  6.09 P   Pre DLCO 15.68 P   DLCOVA PRE 3.86 P   VA PRE 4.06 Low  P   IVC PRE 1.53 Low  P   Pre TLC 3.68 P   VC PRE 1.47 Low  P   Pre FRC PL 2.68 P   Pre ERV 0.48 P   Pre RV 2.21 P   RVTLC PRE 59.95 High  P   Raw PRE 6.39 High  P   Pre FVC 1.42 Low  P   Pre FEV1 1.04 Low  P   Pre FEV1 FVC 73.18 P   Pre FEF 25 75 0.64 Low  P   Pre PEF 3.10 P   Pre  6.40 P   Pre MVV 48.90 Low  P   FVC Ref 2.17 P   FVC LLN 1.55 P   FVC Pre Ref 65.4 P   FVC Post Ref 67.8 P   FVC Chg 3.7 P   FEV1 Ref 1.72 P   FEV1 LLN 1.21 P   FEV1 Pre Ref 60.4 P   FEV1 Post Ref 60.3 P   FEV1 Chg -0.3 P   FEV1 FVC Ref 79 P   FEV1 FVC LLN 67 P   FEV1 FVC Pre Ref 92.1 P   FEV1 FVC Post Ref 88.6 P   FEV1 FVC Chg -3.9 P   FEF 25 75 Ref 2.55 P   FEF 25 75 LLN 1.15 P   FEF 25 75 Pre Ref 25.2 P   FEF 25 75 Post Ref 24.9 P   FEF 25 75 Chg -1.3 P   PEF Ref 4.46 P   PEF LLN 2.70 P   PEF Pre Ref 69.5 P   PEF Post Ref 74.3 P   PEF Chg 6.9 P   [id number] Chg -4.8 P   MVV Ref 70 P   MVV LLN 59 P   MVV Pre Ref 70.1 P   TLC Ref 4.27 P   TLC LLN 3.28 P   TLC Pre Ref 86.2 P   VC Ref 2.17 P   VC LLN 1.55 P   VC Pre Ref 68.0 P   FRCpleth Ref 2.48 P   FRCpleth LLN 1.66 P   FRCpleth PreRef 108.2 P   ERV Ref 0.65 P   ERV LLN -65886.35 P   ERV Pre Ref 73.5 P   RV Ref 1.83 P   RV LLN 1.25 P   RV Pre Ref 120.5 P   RVTLC Ref 42 P   RVTLC LLN 32 P   RVTLC Pre Ref 143.6 P   Raw Ref 3.06 P   Raw LLN 3.06 P   Raw Pre Ref 209.0 P   DLCO Single Breath Ref 19.24 P   DLCO Single Breath LLN 13.51 P   DLCO Single Breath Pre Ref 81.5 P   DLCOc Single Breath Ref 19.24 P  "  DLCOc Single Breath LLN 13.51 P   DLCOVA Ref 4.51 P   DLCOVA LLN 2.80 P   DLCOVA Pre Ref 85.6 P   DLCOc SBVA Ref 4.51 P   DLCOc SBVA LLN 2.80 P   VA Single Breath Ref 4.12 P   VA Single Breath LLN 4.12 P   VA Single Breath Pre Ref 98.6 P   IVC Single Breath Ref 2.17 P   IVC Single Breath LLN 1.55 P   IVC Single Breath Pre Ref 70.6 P   Resulting Agency [facility]                    Past Medical History:   Diagnosis Date    COPD (chronic obstructive pulmonary disease)     COPD (chronic obstructive pulmonary disease)     Rheumatoid arthritis, unspecified     Type 2 diabetes mellitus without complications      Past Surgical History:   Procedure Laterality Date    BREAST SURGERY       OB History          2    Para   2    Term                AB        Living   2         SAB        IAB        Ectopic        Multiple        Live Births                   Review of Systems   Constitutional: Positive for malaise/fatigue. Negative for chills, decreased appetite, diaphoresis, fever, night sweats, weight gain and weight loss.   Eyes: Negative.    Cardiovascular:  Positive for dyspnea on exertion. Negative for chest pain, claudication, cyanosis, irregular heartbeat, leg swelling, near-syncope, orthopnea, palpitations, paroxysmal nocturnal dyspnea and syncope.   Respiratory:  Negative for cough, hemoptysis and shortness of breath.    Endocrine: Negative.    Hematologic/Lymphatic: Negative.    Skin:  Negative for color change, dry skin and nail changes.   Musculoskeletal: Negative.    Gastrointestinal: Negative.    Genitourinary: Negative.    Neurological:  Negative for weakness.       Objective:   Blood pressure (!) 166/80, pulse (!) 112, height 5' 2" (1.575 m), weight 87.5 kg (192 lb 14.4 oz), SpO2 (!) 93%.body mass index is 35.28 kg/m².  Physical Exam  Vitals and nursing note reviewed.   Constitutional:       Appearance: She is well-developed.   HENT:      Head: Normocephalic.   Eyes:      Pupils: Pupils are equal, " "round, and reactive to light.   Neck:      Vascular: No JVD.   Cardiovascular:      Rate and Rhythm: Normal rate and regular rhythm.      Chest Wall: PMI is not displaced.      Pulses: Intact distal pulses.      Heart sounds: Normal heart sounds and S2 normal. No murmur heard.     No friction rub. No gallop.   Pulmonary:      Effort: Pulmonary effort is normal.      Breath sounds: Normal breath sounds. No wheezing or rales.   Abdominal:      General: Bowel sounds are normal.      Palpations: Abdomen is soft.   Musculoskeletal:      Cervical back: Neck supple.   Neurological:      Mental Status: She is alert and oriented to person, place, and time.         Labs:    Chemistry        Component Value Date/Time     02/05/2025 0951    K 4.3 02/05/2025 0951     02/05/2025 0951    CO2 27 02/05/2025 0951    BUN 10.5 02/05/2025 0951    CREATININE 0.88 02/05/2025 0951        Component Value Date/Time    CALCIUM 9.3 02/05/2025 0951    ALKPHOS 51 02/05/2025 0951    AST 18 02/05/2025 0951    ALT 16 02/05/2025 0951    BILITOT 0.6 02/05/2025 0951    EGFRNONAA 78 (L) 09/26/2017 0340          No results found for: "MG"  Lab Results   Component Value Date    WBC 8.57 02/05/2025    HGB 11.9 (L) 02/05/2025    HCT 36.9 (L) 02/05/2025     02/05/2025     No results found for: "INR", "PROTIME"  Natriuretic Peptide   Date Value Ref Range Status   09/25/2017 <20.0 0.0 - 100.0 pg/mL Final         Assessment:      1. Scleroderma    2. Pulmonary HTN    3. Type 2 diabetes mellitus without complication, without long-term current use of insulin    4. Chronic obstructive pulmonary disease, unspecified COPD type    5. BMI 35.0-35.9,adult        Plan:   Mild PH by echo wi9th estimated PASP=49 mm of Hg with normal RV size and function. Etiology of her pH is unclear as she has MCTD (RA, scleroderma) but also COPD/IAM and HTN (ie WHO group 2/3). Recommend the following;  RHC to better assess her PA pressures and etiology.  Patient was " told that the RHC would be done today and drove from [place]. I spoke to the [facility]. We will be able to get her RHC done today. Really appreciate Dr. Perez and [facility] Team's help with getting her RHc done today.   Risks and benefits of RHC were discussed and consents were obtained and signed in clinic.   Further recs regarding next step in treatment will be determined based on her RHC findings.   Recommend 2 gram sodium restriction and 1500cc fluid restriction.  Encourage physical activity with graded exercise program.  Requested patient to weigh themselves daily, and to notify us if their weight increases by more than 3 lbs in 1 day or 5 lbs in 1 week.     Advance Care Planning     Date: 04/25/2025    Power of   I initiated the process of voluntary advance care planning today and explained the importance of this process to the patient.  I introduced the concept of advance directives to the patient, as well. Then the patient received detailed information about the importance of designating a Health Care Power of  (HCPOA). She was also instructed to communicate with this person about their wishes for future healthcare, should she become sick and lose decision-making capacity. The patient has not previously appointed a HCPOA. After our discussion, the patient has decided to complete a HCPOA and has appointed her son, health care agent: Kody Posada Jr. I encouraged her to communicate with this person about their wishes for future healthcare, should she become sick and lose decision-making capacity.     Rowan Hess MD

## 2025-04-25 NOTE — BRIEF OP NOTE
Patient tolerated the procedure well. Please see complete RHC procedure note for full details and results. Stable to return from cath lab to their hospital room.  Procedure: Right heart catheterization   Procedure date: 04/25/2025    Discharge date: 04/25/2025  Estimated blood loss: less than 10 cc  Complications: none  Condition at discharge: stable  Discharge instructions: no heavy lifting greater than 5 lbs and no bearing down/coughing without holding pressure over incision site.

## 2025-04-28 ENCOUNTER — PATIENT MESSAGE (OUTPATIENT)
Dept: TRANSPLANT | Facility: CLINIC | Age: 70
End: 2025-04-28
Payer: MEDICARE

## 2025-04-29 ENCOUNTER — TELEPHONE (OUTPATIENT)
Dept: RHEUMATOLOGY | Facility: CLINIC | Age: 70
End: 2025-04-29
Payer: MEDICARE

## 2025-04-29 DIAGNOSIS — I27.20 CHRONIC PULMONARY HYPERTENSION: ICD-10-CM

## 2025-04-29 RX ORDER — TADALAFIL 20 MG/1
20 TABLET ORAL DAILY
Qty: 60 TABLET | Refills: 11 | Status: SHIPPED | OUTPATIENT
Start: 2025-04-29

## 2025-04-29 NOTE — TELEPHONE ENCOUNTER
----- Message from TITA Soares sent at 4/29/2025  1:54 PM CDT -----  Regarding: patient has Pulmonary Hypertension  Good afternoon,I wanted Dr. Geller to know this patient will be a new PH patient with us. We have started the process to get the patient on Tadalafil. The patient wanted to make sure you all were aware of what was happening with the PH clinic visit the patient had last week.Thank you!!

## 2025-05-06 DIAGNOSIS — I27.20 CHRONIC PULMONARY HYPERTENSION: ICD-10-CM

## 2025-05-06 RX ORDER — TADALAFIL 20 MG/1
20 TABLET ORAL DAILY
Qty: 60 TABLET | Refills: 11 | Status: SHIPPED | OUTPATIENT
Start: 2025-05-06

## 2025-05-06 NOTE — TELEPHONE ENCOUNTER
NN received information form [facility] that the patient's insurance information is not active. [facility] reached out to the patient but has been unable to get the new insurance information. NN called the patient gave her the number to [facility] and asked that she call to give them her new insurance information. Patient verbalized understanding of all of the above information.

## 2025-05-08 ENCOUNTER — LAB VISIT (OUTPATIENT)
Dept: LAB | Facility: HOSPITAL | Age: 70
End: 2025-05-08
Attending: INTERNAL MEDICINE
Payer: MEDICARE

## 2025-05-08 DIAGNOSIS — R06.82 TACHYPNEA: ICD-10-CM

## 2025-05-08 DIAGNOSIS — M34.9 SCLERODERMA: ICD-10-CM

## 2025-05-08 DIAGNOSIS — Z79.899 POLYPHARMACY: ICD-10-CM

## 2025-05-08 LAB
ALBUMIN SERPL-MCNC: 4 G/DL (ref 3.4–4.8)
ALBUMIN/GLOB SERPL: 1 RATIO (ref 1.1–2)
ALP SERPL-CCNC: 44 UNIT/L (ref 40–150)
ALT SERPL-CCNC: 12 UNIT/L (ref 0–55)
ANION GAP SERPL CALC-SCNC: 7 MEQ/L
AST SERPL-CCNC: 17 UNIT/L (ref 11–45)
BASOPHILS # BLD AUTO: 0.05 X10(3)/MCL
BASOPHILS # BLD AUTO: 0.09 X10(3)/MCL
BASOPHILS NFR BLD AUTO: 0.9 %
BASOPHILS NFR BLD AUTO: 1.6 %
BILIRUB SERPL-MCNC: 0.7 MG/DL
BNP BLD-MCNC: 129.2 PG/ML
BUN SERPL-MCNC: 11.4 MG/DL (ref 9.8–20.1)
CALCIUM SERPL-MCNC: 9.3 MG/DL (ref 8.4–10.2)
CHLORIDE SERPL-SCNC: 107 MMOL/L (ref 98–107)
CO2 SERPL-SCNC: 25 MMOL/L (ref 23–31)
CREAT SERPL-MCNC: 0.75 MG/DL (ref 0.55–1.02)
CREAT/UREA NIT SERPL: 15
CRP SERPL-MCNC: 3.2 MG/L
EOSINOPHIL # BLD AUTO: 0.36 X10(3)/MCL (ref 0–0.9)
EOSINOPHIL # BLD AUTO: 0.42 X10(3)/MCL (ref 0–0.9)
EOSINOPHIL NFR BLD AUTO: 6.2 %
EOSINOPHIL NFR BLD AUTO: 7.3 %
ERYTHROCYTE [DISTWIDTH] IN BLOOD BY AUTOMATED COUNT: 15.2 % (ref 11.5–17)
ERYTHROCYTE [DISTWIDTH] IN BLOOD BY AUTOMATED COUNT: 15.2 % (ref 11.5–17)
ERYTHROCYTE [SEDIMENTATION RATE] IN BLOOD: 22 MM/HR (ref 0–20)
GFR SERPLBLD CREATININE-BSD FMLA CKD-EPI: >60 ML/MIN/1.73/M2
GLOBULIN SER-MCNC: 4.1 GM/DL (ref 2.4–3.5)
GLUCOSE SERPL-MCNC: 98 MG/DL (ref 82–115)
HBV CORE AB SERPL QL IA: NONREACTIVE
HBV SURFACE AB SER-ACNC: 0.57 MIU/ML
HBV SURFACE AB SERPL IA-ACNC: NONREACTIVE M[IU]/ML
HBV SURFACE AG SERPL QL IA: NONREACTIVE
HCT VFR BLD AUTO: 36 % (ref 37–47)
HCT VFR BLD AUTO: 36.7 % (ref 37–47)
HCV AB SERPL QL IA: NONREACTIVE
HGB BLD-MCNC: 11.7 G/DL (ref 12–16)
HGB BLD-MCNC: 11.8 G/DL (ref 12–16)
HIV 1+2 AB+HIV1 P24 AG SERPL QL IA: NONREACTIVE
IMM GRANULOCYTES # BLD AUTO: 0.02 X10(3)/MCL (ref 0–0.04)
IMM GRANULOCYTES # BLD AUTO: 0.02 X10(3)/MCL (ref 0–0.04)
IMM GRANULOCYTES NFR BLD AUTO: 0.3 %
IMM GRANULOCYTES NFR BLD AUTO: 0.3 %
LYMPHOCYTES # BLD AUTO: 1.57 X10(3)/MCL (ref 0.6–4.6)
LYMPHOCYTES # BLD AUTO: 1.6 X10(3)/MCL (ref 0.6–4.6)
LYMPHOCYTES NFR BLD AUTO: 27.3 %
LYMPHOCYTES NFR BLD AUTO: 27.7 %
MAGNESIUM SERPL-MCNC: 1.8 MG/DL (ref 1.6–2.6)
MCH RBC QN AUTO: 28 PG (ref 27–31)
MCH RBC QN AUTO: 28.1 PG (ref 27–31)
MCHC RBC AUTO-ENTMCNC: 32.2 G/DL (ref 33–36)
MCHC RBC AUTO-ENTMCNC: 32.5 G/DL (ref 33–36)
MCV RBC AUTO: 86.5 FL (ref 80–94)
MCV RBC AUTO: 87 FL (ref 80–94)
MONOCYTES # BLD AUTO: 0.52 X10(3)/MCL (ref 0.1–1.3)
MONOCYTES # BLD AUTO: 0.53 X10(3)/MCL (ref 0.1–1.3)
MONOCYTES NFR BLD AUTO: 9 %
MONOCYTES NFR BLD AUTO: 9.2 %
NEUTROPHILS # BLD AUTO: 3.17 X10(3)/MCL (ref 2.1–9.2)
NEUTROPHILS # BLD AUTO: 3.17 X10(3)/MCL (ref 2.1–9.2)
NEUTROPHILS NFR BLD AUTO: 55 %
NEUTROPHILS NFR BLD AUTO: 55.2 %
NRBC BLD AUTO-RTO: 0 %
NRBC BLD AUTO-RTO: 0 %
PLATELET # BLD AUTO: 343 X10(3)/MCL (ref 130–400)
PLATELET # BLD AUTO: 356 X10(3)/MCL (ref 130–400)
PMV BLD AUTO: 10.8 FL (ref 7.4–10.4)
PMV BLD AUTO: 11.1 FL (ref 7.4–10.4)
POTASSIUM SERPL-SCNC: 3.8 MMOL/L (ref 3.5–5.1)
PROT SERPL-MCNC: 8.1 GM/DL (ref 5.8–7.6)
RBC # BLD AUTO: 4.16 X10(6)/MCL (ref 4.2–5.4)
RBC # BLD AUTO: 4.22 X10(6)/MCL (ref 4.2–5.4)
SODIUM SERPL-SCNC: 139 MMOL/L (ref 136–145)
WBC # BLD AUTO: 5.75 X10(3)/MCL (ref 4.5–11.5)
WBC # BLD AUTO: 5.77 X10(3)/MCL (ref 4.5–11.5)

## 2025-05-08 PROCEDURE — 85025 COMPLETE CBC W/AUTO DIFF WBC: CPT | Mod: 91

## 2025-05-08 PROCEDURE — 87340 HEPATITIS B SURFACE AG IA: CPT

## 2025-05-08 PROCEDURE — 85652 RBC SED RATE AUTOMATED: CPT

## 2025-05-08 PROCEDURE — 86704 HEP B CORE ANTIBODY TOTAL: CPT

## 2025-05-08 PROCEDURE — 80053 COMPREHEN METABOLIC PANEL: CPT

## 2025-05-08 PROCEDURE — 85025 COMPLETE CBC W/AUTO DIFF WBC: CPT

## 2025-05-08 PROCEDURE — 87389 HIV-1 AG W/HIV-1&-2 AB AG IA: CPT

## 2025-05-08 PROCEDURE — 86140 C-REACTIVE PROTEIN: CPT

## 2025-05-08 PROCEDURE — 83880 ASSAY OF NATRIURETIC PEPTIDE: CPT

## 2025-05-08 PROCEDURE — 86803 HEPATITIS C AB TEST: CPT

## 2025-05-08 PROCEDURE — 86706 HEP B SURFACE ANTIBODY: CPT

## 2025-05-08 PROCEDURE — 83735 ASSAY OF MAGNESIUM: CPT

## 2025-05-08 PROCEDURE — 86480 TB TEST CELL IMMUN MEASURE: CPT

## 2025-05-08 PROCEDURE — 36415 COLL VENOUS BLD VENIPUNCTURE: CPT

## 2025-05-11 LAB
GAMMA INTERFERON BACKGROUND BLD IA-ACNC: 0.07 IU/ML
M TB IFN-G BLD-IMP: NEGATIVE
M TB IFN-G CD4+ BCKGRND COR BLD-ACNC: 0.03 IU/ML
M TB IFN-G CD4+CD8+ BCKGRND COR BLD-ACNC: -0.02 IU/ML
MITOGEN IGNF BCKGRD COR BLD-ACNC: 9.31 IU/ML

## 2025-05-14 ENCOUNTER — OFFICE VISIT (OUTPATIENT)
Dept: RHEUMATOLOGY | Facility: CLINIC | Age: 70
End: 2025-05-14
Payer: MEDICARE

## 2025-05-14 VITALS
RESPIRATION RATE: 18 BRPM | BODY MASS INDEX: 35.73 KG/M2 | SYSTOLIC BLOOD PRESSURE: 150 MMHG | OXYGEN SATURATION: 96 % | TEMPERATURE: 98 F | WEIGHT: 194.19 LBS | HEART RATE: 72 BPM | DIASTOLIC BLOOD PRESSURE: 94 MMHG | HEIGHT: 62 IN

## 2025-05-14 DIAGNOSIS — G47.33 OSA ON CPAP: ICD-10-CM

## 2025-05-14 DIAGNOSIS — M34.9 SCLERODERMA: Primary | ICD-10-CM

## 2025-05-14 DIAGNOSIS — E11.9 TYPE 2 DIABETES MELLITUS WITHOUT COMPLICATION, WITHOUT LONG-TERM CURRENT USE OF INSULIN: ICD-10-CM

## 2025-05-14 DIAGNOSIS — I10 PRIMARY HYPERTENSION: ICD-10-CM

## 2025-05-14 DIAGNOSIS — I31.39 PERICARDIAL EFFUSION: ICD-10-CM

## 2025-05-14 DIAGNOSIS — M89.50: ICD-10-CM

## 2025-05-14 DIAGNOSIS — I27.20 PULMONARY HTN: ICD-10-CM

## 2025-05-14 DIAGNOSIS — J84.89 PROGRESSIVE FIBROSING INTERSTITIAL LUNG DISEASE: ICD-10-CM

## 2025-05-14 DIAGNOSIS — D84.821 DRUG-INDUCED IMMUNODEFICIENCY: ICD-10-CM

## 2025-05-14 DIAGNOSIS — J84.9 ILD (INTERSTITIAL LUNG DISEASE): ICD-10-CM

## 2025-05-14 DIAGNOSIS — Z79.899 DRUG-INDUCED IMMUNODEFICIENCY: ICD-10-CM

## 2025-05-14 DIAGNOSIS — R76.8 RHEUMATOID FACTOR POSITIVE: ICD-10-CM

## 2025-05-14 PROCEDURE — 99213 OFFICE O/P EST LOW 20 MIN: CPT | Mod: PBBFAC | Performed by: INTERNAL MEDICINE

## 2025-05-14 PROCEDURE — 99215 OFFICE O/P EST HI 40 MIN: CPT | Mod: S$PBB,,, | Performed by: INTERNAL MEDICINE

## 2025-05-14 PROCEDURE — G2211 COMPLEX E/M VISIT ADD ON: HCPCS | Mod: S$PBB,,, | Performed by: INTERNAL MEDICINE

## 2025-05-14 RX ORDER — MYCOPHENOLATE MOFETIL 500 MG/1
TABLET ORAL
Qty: 120 TABLET | Refills: 3 | Status: SHIPPED | OUTPATIENT
Start: 2025-05-14

## 2025-05-14 RX ORDER — NINTEDANIB 150 MG/1
150 CAPSULE ORAL 2 TIMES DAILY
Qty: 60 CAPSULE | Refills: 6 | Status: ACTIVE | OUTPATIENT
Start: 2025-05-14

## 2025-05-14 RX ORDER — COLCHICINE 0.6 MG/1
0.6 TABLET ORAL 2 TIMES DAILY
Qty: 60 TABLET | Refills: 3 | Status: SHIPPED | OUTPATIENT
Start: 2025-05-14

## 2025-05-14 NOTE — PROGRESS NOTES
Patient ID: [id number]     Chief Complaint: Follow-up (Patient is here for a follow up Scleroderma. Patient denies any complaints at this time. Patient states her SOB has improved. )      HPI:     Yosvany Song is a 69 y.o. female here today for follow-up of scleroderma.    She has type 2 diabetes, hypertension, asthma/COPD, obstructive sleep apnea on CPAP.   She saw Dr. Rai she was diagnosed with RA and was treated with methotrexate, has not been on it for more than a year.  She was seen by me at [facility] in the past. I diagnosed her with scleroderma due to positive VIKRAM and high titer RNA claudette III and started her on Plaquenil.      She has interstitial lung disease and pulmonary hypertension from scleroderma.  Started CellCept, did not tolerate higher dose, currently tolerating 1000 mg in the morning and 500 mg at night.    She was referred to Pulmonary hypertension Clinic in [place], after evaluation tadalafil was prescribed, she has not started it yet, waiting for prescription to be filled.  Complaining of intermittent shortness of breath, gets these episodes every 3-4 months.  She went to ER in [facility] in March 2025, she was told she had fluid around her heart and was discharged on Lasix.  Shortness of breath is much better.  She is not needing Lasix.  Started colchicine 0.6 mg b.i.d., tolerating it well without any issues.  Intermittent joint pain in right ankle and foot, does not have joint pain all day every day.  Denies red, warm and swollen joints.  No morning stiffness.   Denies any episodes of low grade fevers, rashes, morning stiffness, myalgias, joint pains, red, swollen, inflamed joints.     Pulmonologist Dr Prince.  Cardiologist Dr Manny Burnham.      Denies history of photosensitivity, oral or nasal ulcers, h/o MI, stroke, seizures, h/o PE or DVT, Raynaud's phenomenon, uveitis, malignancies.   Family history of autoimmune disease: None  Pregnancies: 2  Miscarriages: 1 (details not  known)  Surgical history : breast lumpectomy (2022) , hernia repair surgery (around 6 yrs ago)  Smoking: never. (Passive smoking from )    Social History     Tobacco Use   Smoking Status Never   Smokeless Tobacco Never        Past medical history  -------------------------------------    COPD (chronic obstructive pulmonary disease)    COPD (chronic obstructive pulmonary disease)    Rheumatoid arthritis, unspecified    Type 2 diabetes mellitus without complications        Past Surgical History:   Procedure Laterality Date    BREAST SURGERY      RIGHT HEART CATHETERIZATION Right 4/25/2025    Procedure: INSERTION, CATHETER, RIGHT HEART;  Surgeon: Kenyatta Perez MD;  Location: [facility] CATH LAB;  Service: Cardiology;  Laterality: Right;       Review of patient's allergies indicates:   Allergen Reactions    Azithromycin Itching and Swelling     Other reaction(s): rash    Levofloxacin Itching and Swelling     Other reaction(s): rash    Shellfish containing products Swelling    Morphine Other (See Comments)       Outpatient Medications Marked as Taking for the 5/14/25 encounter (Office Visit) with Aries Ames MD   Medication Sig Dispense Refill    albuterol (PROVENTIL/VENTOLIN HFA) 90 mcg/actuation inhaler Inhale 2 puffs into the lungs every 6 (six) hours as needed.      alendronate (FOSAMAX) 70 MG tablet Take 70 mg by mouth every 7 days.      amlodipine-benazepril 5-20 mg (LOTREL) 5-20 mg per capsule Take 2 capsules by mouth once daily.      cholecalciferol, vitamin D3, 1,250 mcg (50,000 unit) capsule Take 50,000 Units by mouth every 7 days.      ezetimibe (ZETIA) 10 mg tablet Take 10 mg by mouth once daily.      folic acid (FOLVITE) 1 MG tablet Take 1 tablet (1,000 mcg total) by mouth once daily. 30 tablet 5    gas permeable  (OXYGEN PERMEABLE  MISC) 2L/min cont      JANUVIA 100 mg Tab Take 100 mg by mouth once daily.      OZEMPIC 0.25 mg or 0.5 mg(2 mg/1.5 mL) pen injector Inject 0.5 mg  into the skin every 7 days.      SITagliptin phosphate (JANUVIA) 25 MG Tab Take 25 mg by mouth once daily.      TRELEGY ELLIPTA 100-62.5-25 mcg DsDv Take 1 puff by mouth.      TRUE METRIX GLUCOSE TEST STRIP Strp SMARTSIG:Via Meter Daily      [DISCONTINUED] colchicine (COLCRYS) 0.6 mg tablet Take 1 tablet (0.6 mg total) by mouth 2 (two) times daily. 60 tablet 3    [DISCONTINUED] mycophenolate (CELLCEPT) 500 mg Tab 500 mg twice a day for a week and increase dose to 1000 mg twice a day 120 tablet 1     Current Facility-Administered Medications for the 5/14/25 encounter (Office Visit) with Aries Ames MD   Medication Dose Route Frequency Provider Last Rate Last Admin    albuterol nebulizer solution 2.5 mg  2.5 mg Nebulization Q4H PRN Aries Ames MD   2.5 mg at 03/12/25 1251       Social History     Socioeconomic History    Marital status:    Tobacco Use    Smoking status: Never    Smokeless tobacco: Never   Substance and Sexual Activity    Alcohol use: Not Currently    Drug use: Never     Social Drivers of Health     Financial Resource Strain: Low Risk  (4/19/2025)    Overall Financial Resource Strain (CARDIA)     Difficulty of Paying Living Expenses: Not hard at all   Food Insecurity: No Food Insecurity (4/19/2025)    Hunger Vital Sign     Worried About Running Out of Food in the Last Year: Never true     Ran Out of Food in the Last Year: Never true   Transportation Needs: No Transportation Needs (4/19/2025)    PRAPARE - Transportation     Lack of Transportation (Medical): No     Lack of Transportation (Non-Medical): No   Physical Activity: Insufficiently Active (4/19/2025)    Exercise Vital Sign     Days of Exercise per Week: 4 days     Minutes of Exercise per Session: 10 min   Stress: No Stress Concern Present (4/19/2025)    [employer] [place] [employer] - Occupational Stress Questionnaire     Feeling of Stress : Not at all   Housing Stability: Low Risk  (4/19/2025)    Housing Stability  Vital Sign     Unable to Pay for Housing in the Last Year: No     Number of Times Moved in the Last Year: 0     Homeless in the Last Year: No        Family History   Problem Relation Name Age of Onset    Kidney disease Mother      Heart disease Father      Heart disease Sister          Immunization History   Administered Date(s) Administered    COVID-19, MRNA, LN-S, PF (Pfizer) (Gray Cap) 06/21/2022    COVID-19, MRNA, LN-S, PF (Pfizer) (Purple Cap) 02/25/2021, 03/19/2021, 10/02/2021    COVID-19, mRNA, LNP-S, PF (Moderna) Ages 12+ 10/04/2023    COVID-19, mRNA, LNP-S, bivalent booster, PF (PFIZER OMICRON) 09/30/2022    Influenza - Quadrivalent - High Dose - PF (65 years and older) 10/04/2023    Influenza - Quadrivalent - PF *Preferred* (6 months and older) 09/26/2017, 10/13/2020, 09/21/2022    Influenza - Trivalent - Fluad - Adjuvanted - PF (65 years and older 01/30/2025    Pneumococcal Conjugate - 20 Valent 05/01/2023    RSVpreF (Arexvy) 11/08/2023    Tdap 05/01/2023    Zoster Recombinant 10/06/2022, 05/01/2023       Patient Care Team:  Debora Javed APRN as PCP - General (Family Medicine)  Fany Bernabe, RN as Pulmonary Hypertension Coordinator (Advanced Heart Failure & Transplant Cardiology)  Matilde Driscoll, RN as Pulmonary Hypertension Coordinator (Advanced Heart Failure & Transplant Cardiology)     Subjective:     Constitutional:  Denies chills. Denies fever. Denies night sweats. Denies weight loss.   Ophthalmology: Denies blurred vision. Denies dry eyes. Denies eye pain. Denies Itching and redness.   ENT: Denies oral ulcers. Denies epistaxis. Denies dry mouth. Denies swollen glands.   Endocrine: Admits diabetes. Denies thyroid Problems.   Respiratory: Denies cough. Admits shortness of breath. Admits shortness of breath with exertion. Denies hemoptysis.   Cardiovascular: Denies chest pain at rest. Denies chest pain with exertion. Denies palpitations.    Gastrointestinal: Denies  "abdominal pain. Denies diarrhea. Denies nausea. Denies vomiting. Denies hematemesis or hematochezia. Admits heartburn.  Genitourinary: Denies blood in urine.  Musculoskeletal: See HPI for details  Integumentary: Denies rash. Denies photosensitivity.   Peripheral Vascular: Denies Ulcers of hands and/or feet. Denies Cold extremities.   Neurologic: Denies dizziness. Denies headache.  Denies loss of strength. Denies numbness or tingling.   Psychiatric: Denies depression. Denies anxiety. Denies suicidal/homicidal ideations.      Objective:     BP (!) 150/94 (BP Location: Left arm, Patient Position: Sitting)   Pulse 72   Temp 98.4 °F (36.9 °C) (Oral)   Resp 18   Ht 5' 2" (1.575 m)   Wt 88.1 kg (194 lb 3.2 oz)   SpO2 96%   BMI 35.52 kg/m²     Physical Exam    General Appearance: alert, pleasant, in no acute distress.  Skin: Skin color, texture, turgor normal. No rashes or lesions.   Hyperpigmented macular rash to forehead, bridge of nose and chest with mild skin thickening.  Few dilated capillary nail folds.  No synovitis on exam.  Resorption of distal index fingers bilaterally.  No distal pitting or skin thickening of bilateral hands.  Eyes:  extraocular movement intact (EOMI), pupils equal, round, reactive to light and accommodation, conjunctiva clear.  ENT: No oral or nasal ulcers.  Neck:  Neck supple. No adenopathy.   Lungs: CTA throughout without crackles, rhonchi, or wheezes.   Heart: RRR w/o murmurs.  2+ palpable DP/TP pulse.  Abdomen: Soft, non-tender, no masses, rebound or guarding.  Neuro: Alert, oriented, CN II-XII GI, sensory and motor innervation intact.  Musculoskeletal: Second distal phalanx was short bilaterally. A heberden node present on the right middle finger.  No synovitis on exam.  Psych: Alert, oriented, normal eye contact.    Labs:     Records from [facility] [place]: 2019:  Rheumatoid factor 174.  Anti CCP weak positive 35, normal less than 20.  Positive VIKRAM 1:1280, nuclear, speckled pattern.  Lupus " anticoagulant, cardiolipin and beta 2 glycoprotein negative.  RNA polymerase 3 high titer positive, greater than 80.  Negative dsDNA, centromere, RNP, Smith, Scl 70, SSA, SSB, TPO, thyroglobulin.  ACE level low.    CT chest 2017: No PE, scattered atelectasis in bilateral lung bases.    Echocardiogram 2017: EF 61%, diastolic dysfunction.   2019: chest x-ray normal.  X-ray of left hand showed partial amputation of distal aspect of index finger, defect in distal tuft of bilateral index finger appear chronic, DJD changes in bilateral wrists, no erosions in bilateral MCPs, DJD of bilateral feet and moderate DJD of bilateral knees, no erosions.    CT chest 2019:  Mild chronic interstitial lung disease with mild bronchiectasis, no honeycombing, scattered nodes in mediastinum, prominence of pulmonary artery trunk, can not exclude pulmonary artery hypertension.    2019: PFTs:  FVC 85%, FEV1 54%, ratio 64, total lung capacity 122%, DLCO 58%, DLCO by VA 60%.    She was diagnosed with scleroderma, questionable Raynaud's, abnormal capillary nail folds, rash on chest and forehead with mild skin thickening, acral osteolysis and was treated with Plaquenil.  Discussed about risk of scleroderma renal crisis because of positive RNA polymerase 3.     7/22/24: VIKRAM 1:640 speckled. . CCP equivocal.   02/05/2025:  Upc 200 milligram/gram.  No protein and blood in urine.  ESR 40.  CMP okay.  CRP 20.  Hemoglobin 11.9.  C3, C4 okay.  RNA polymerase 3 positive, greater than 150.    05/08/2025:  CMP okay.  ESR 22.  Hemoglobin 11.7.  Hepatitis-C, hepatitis-B, HIV negative.  CRP normal.   Quantiferon tb negative.    PFTs 03/12/2025: FVC 65%, FEV1 60%, ratio 92, total lung capacity 86%, DLCO 81%, DLCO by VA 85%.     echo 11/2023:  EF greater than 55%, normal RVSP, mild MR/ER.  Echocardiogram 03/13/2025:  EF 62%, normal left ventricular systolic function.  Normal right ventricular systolic function.  PASP 49 mmHg.    CT chest 03/17/2025:   Bibasilar predominant subpleural interlobular septal thickening with ground-glass opacities, attenuation in basal segment of left lower lobe consistent with systemic sclerosis.  Minimal upper lobe subpleural interlobular septal thickening.  Nodular septal thickening in right upper lobe.  Pulmonary hypertension.  Pericardial effusion.  Discussed with Radiology, mild early interstitial lung disease.    Requested and reviewed cardiology records: 12/30/24: Dr Manny Burnham: She has COPD on home o2, IAM with CPAP, DM II, HTN, HLD, MR. 11/2023: EF >55%, mild MR/AR, normal RVSP- no value given. Scheduled for PET scan, due to h/o ER. ACS was ruled out. 12/24/24: CXR showed increased pulmonary vascular and interstitial markings. CBC, CMP ok. CPK normal. Normal troponin. , normal less than 125.   Cardiology records from Dr. Manny Burnham, echo 11/2023:  EF greater than 55%, normal RVSP, mild MR/ER.  2/3/25:  PET scan normal, myocardial perfusion normal no evidence of myocardial ischemia.    Assessment:       ICD-10-CM ICD-9-CM   1. Scleroderma  M34.9 710.1   2. Progressive fibrosing interstitial lung disease  J84.89 515   3. ILD (interstitial lung disease)  J84.9 515   4. 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 Primary hypertension  I10 401.9   7. IAM on CPAP  G47.33 327.23   8. Type 2 diabetes mellitus without complication, without long-term current use of insulin  E11.9 250.00   9. Drug-induced immunodeficiency  D84.821 279.3    Z79.899 E947.9   10. Rheumatoid factor positive  R76.8 795.79   11. Pericardial effusion  I31.39 423.9            Plan:       1. Scleroderma:  Positive VIKRAM and RNA polymerase 3- high titer.  Acral osteolysis of bilateral index fingers and few dilated capillary nail folds.  Mild ILD on CT chest in 2019, repeat CT chest in March 2025 showed worsening of ILD, bibasilar interlobular septal thickening with ground-glass opacities, right upper lobe, pulmonary hypertension  in pericardial effusion.  PFTs showed decline in FVC from 85 to 65% in March 2025.  Echocardiogram in March 2025 showed PASP is elevated 49 mmHg.  - discussed the disease course and treatment options of scleroderma with the patient.  - progressive interstitial lung disease, did not tolerate higher dose of CellCept due to headache.  Continue with CellCept 1000 mg in the morning and 500 mg at night.  - progressive interstitial lung disease, start Ofev 150 mg b.i.d., discussed the risks and benefits of medication with the patient.  Labs in 4 weeks after starting it.  - pulmonary hypertension:  Referred to Pulmonary hypertension Clinic in [place] for further evaluation and treatment.  Currently denies symptoms of orthopnea.  Shortness of breath with exertion improved.  She was evaluated and tadalafil was prescribed, she has not started it yet.  - educated patient to try to avoid longstanding high-dose steroids due to increased risk of scleroderma renal crisis with RNA polymerase 3.  Patient verbalized understanding.    2. Pericardial effusion  Continue with colchicine 0.6 mg b.i.d..  Discussed the risks and benefits of medication with the patient.  Will discontinue colchicine after 3 months.  Shortness of breath has improved.  - she is not taking Lasix.  Advised to continue close follow-up with cardiology.  - requested and reviewed cardiology records. 12/30/24: Dr Manny Burnham: She has COPD on home o2, IAM with CPAP, DM II, HTN, HLD, MR. 11/2023: EF >55%, mild MR/AR, normal RVSP- no value given. Scheduled for PET scan, due to h/o ER. ACS was ruled out. 12/24/24: CXR showed increased pulmonary vascular and interstitial markings. CBC, CMP ok. CPK normal. Normal troponin. , normal less than 125. 2/3/25:  PET scan normal, myocardial perfusion normal no evidence of myocardial ischemia.   - echocardiogram in March 2025 showed PASP 49 mmHg.    3. Pul HTN: staring Tadafil, RHC:  04/25/2025:  RA pressure 13/9 (90, RV  pressure 44 x 3 (9), PA pressure 40 x 16 (28).  Wedge pressure 19 x 15 (14).  Continue follow-up with pulmonary hypertension specialist in [place].    3. Positive rheumatoid factor, she does not have signs and symptoms of rheumatoid arthritis.  No synovitis on exam. Rheumatoid factor 174.  Anti CCP weak positive 35, normal less than 20.  Osteoarthritis in bilateral knees and in other joints.     4. History of miscarriage in 1st trimester, antiphospholipid panel negative in the past.    5. IAM on CPAP: Patient on CPAP at home. Follows up with pulmonology. IS compliant with CPAP per patient.     6. Chronic obstructive pulmonary disease: Intermittent use of oxygen per patient. No h/o of active smoking. Follows with pulmonology, continue follow up with Dr Prince.     7. Primary hypertension: BP slightly high today. Advised to stay compliant with medications.     8. Type 2 diabetes mellitus: Care per primary care physician.    9. Drug-induced immunodeficiency: Persons with rheumatoid arthritis, lupus, psoriatic arthritis and other autoimmune diseases are at increased risk of cardiovascular disease including heart attack and stroke. We recommend that all patients with these conditions have annual health maintenance exams including lipid measurements, blood pressure measurements, and smoking cessation counseling when applicable at their primary care provider's office.   Advised to stay up-to-date on age appropriate vaccinations and malignancy screening, including yearly skin exams.        Follow up for Keep appointments as scheduled. . In addition to their scheduled follow up, the patient has also been instructed to follow up on as needed basis.      Total time spent with patient and documentation is 50 minutes. All questions were answered to patient's satisfaction and patient verbalized understanding.

## 2025-05-15 ENCOUNTER — TELEPHONE (OUTPATIENT)
Dept: RHEUMATOLOGY | Facility: CLINIC | Age: 70
End: 2025-05-15
Payer: MEDICARE

## 2025-05-15 NOTE — TELEPHONE ENCOUNTER
Ofev prescription was sent to [facility].  Check after a week to make sure it is processed and patient received the medication.

## 2025-05-27 ENCOUNTER — TELEPHONE (OUTPATIENT)
Dept: TRANSPLANT | Facility: CLINIC | Age: 70
End: 2025-05-27
Payer: MEDICARE

## 2025-05-27 NOTE — TELEPHONE ENCOUNTER
NN called the patient to discuss toleration of the patient's Tadalafil. The patient stated she was fine on the Tadalafil 20 mg but when she increased to 40 mg she starting having blurry vision and one eye hurts. Patient stated so she only took one Tadalafil today. NN told the patient she will discuss with the providers but she should make an appointment with her eye doctor to make sure it isn't something else effecting her eyes. NN stated she will call the patient this afternoon once she speaks with the provider. Patient verbalized understanding of all of the above information.     @1 pm  NN discussed the above corners with Dr. Perez.  Per Dr. Perez the patient is to see an eye doctor and remain on the 20 mg of Tadalafil. Dr. Perez stated that the patient should stop the Tadalafil if the symptoms get worse on one 20 mg of Tadalafil. Dr. Perez also stated that the patient is to stop the Tadalafil all together if the symptoms do not improve by Thursday or Friday of this week. NN then call the patient and stated all of the above orders from Dr. Perez. NN asked that the patient call NN if she does have to stop the Tadalafil so the providers can come up with another medication plan. Patient verbalized understanding of all of the above information.

## 2025-05-30 ENCOUNTER — TELEPHONE (OUTPATIENT)
Dept: TRANSPLANT | Facility: CLINIC | Age: 70
End: 2025-05-30
Payer: MEDICARE

## 2025-05-30 NOTE — TELEPHONE ENCOUNTER
NN followed up with patient about symptoms and her visit with eye doctor. Patient states that she went to eye doctor and she had an infection in her eye that caused the blurry vision. She has been given eye drops and her vision has improved so she decided to start 40 mg of tadalafil and has been doing fine, no vision issues. NN reiterated the importance of informing NN if blurry vision returns because it is an adverse reaction of Tadalafil. Patient verbalized understanding and states she has a follow up with her eye doctor to make sure her infection  has cleared up.

## 2025-06-09 ENCOUNTER — TELEPHONE (OUTPATIENT)
Dept: TRANSPLANT | Facility: CLINIC | Age: 70
End: 2025-06-09
Payer: MEDICARE

## 2025-06-09 NOTE — TELEPHONE ENCOUNTER
NN called the patient to check on her. Patient states her BP today 116/80 and she concerned that was low. NN asked the patient if she was dizzy, lightheaded and/or tired. Patient stated no. NN explained that patients BP can be as low as 90 for the top number and as long as they do not have the symptoms just mentioned it is fine. NN then asked how her eyes were. Patient stated she saw her eye doctor last week and she is now finished with her eye drops. Patient voiced improvement in her S.O.B. when doing errands. Patient stated she no longer has to get a cart to go shopping. NN stated the patient would be scheduled in September to come back into clinic. Patient verbalized understanding of all of the above information. Recall and remind me placed.  
52

## 2025-06-25 ENCOUNTER — PATIENT MESSAGE (OUTPATIENT)
Dept: TRANSPLANT | Facility: CLINIC | Age: 70
End: 2025-06-25
Payer: MEDICARE

## 2025-07-01 ENCOUNTER — LAB VISIT (OUTPATIENT)
Dept: LAB | Facility: HOSPITAL | Age: 70
End: 2025-07-01
Attending: INTERNAL MEDICINE
Payer: MEDICARE

## 2025-07-01 DIAGNOSIS — M34.9 SCLERODERMA: ICD-10-CM

## 2025-07-01 LAB
ALBUMIN SERPL-MCNC: 3.7 G/DL (ref 3.4–4.8)
ALBUMIN/GLOB SERPL: 0.9 RATIO (ref 1.1–2)
ALP SERPL-CCNC: 38 UNIT/L (ref 40–150)
ALT SERPL-CCNC: 11 UNIT/L (ref 0–55)
ANION GAP SERPL CALC-SCNC: 6 MEQ/L
AST SERPL-CCNC: 14 UNIT/L (ref 11–45)
BASOPHILS # BLD AUTO: 0.03 X10(3)/MCL
BASOPHILS NFR BLD AUTO: 0.6 %
BILIRUB SERPL-MCNC: 0.5 MG/DL
BUN SERPL-MCNC: 7.8 MG/DL (ref 9.8–20.1)
CALCIUM SERPL-MCNC: 9.1 MG/DL (ref 8.4–10.2)
CHLORIDE SERPL-SCNC: 107 MMOL/L (ref 98–107)
CO2 SERPL-SCNC: 27 MMOL/L (ref 23–31)
CREAT SERPL-MCNC: 0.79 MG/DL (ref 0.55–1.02)
CREAT/UREA NIT SERPL: 10
EOSINOPHIL # BLD AUTO: 0.29 X10(3)/MCL (ref 0–0.9)
EOSINOPHIL NFR BLD AUTO: 6.2 %
ERYTHROCYTE [DISTWIDTH] IN BLOOD BY AUTOMATED COUNT: 14 % (ref 11.5–17)
GFR SERPLBLD CREATININE-BSD FMLA CKD-EPI: >60 ML/MIN/1.73/M2
GLOBULIN SER-MCNC: 4 GM/DL (ref 2.4–3.5)
GLUCOSE SERPL-MCNC: 144 MG/DL (ref 82–115)
HCT VFR BLD AUTO: 35.2 % (ref 37–47)
HGB BLD-MCNC: 11.4 G/DL (ref 12–16)
IMM GRANULOCYTES # BLD AUTO: 0.01 X10(3)/MCL (ref 0–0.04)
IMM GRANULOCYTES NFR BLD AUTO: 0.2 %
LYMPHOCYTES # BLD AUTO: 1.51 X10(3)/MCL (ref 0.6–4.6)
LYMPHOCYTES NFR BLD AUTO: 32.4 %
MCH RBC QN AUTO: 28.6 PG (ref 27–31)
MCHC RBC AUTO-ENTMCNC: 32.4 G/DL (ref 33–36)
MCV RBC AUTO: 88.4 FL (ref 80–94)
MONOCYTES # BLD AUTO: 0.41 X10(3)/MCL (ref 0.1–1.3)
MONOCYTES NFR BLD AUTO: 8.8 %
NEUTROPHILS # BLD AUTO: 2.41 X10(3)/MCL (ref 2.1–9.2)
NEUTROPHILS NFR BLD AUTO: 51.8 %
NRBC BLD AUTO-RTO: 0 %
PLATELET # BLD AUTO: 289 X10(3)/MCL (ref 130–400)
PMV BLD AUTO: 10.6 FL (ref 7.4–10.4)
POTASSIUM SERPL-SCNC: 3.3 MMOL/L (ref 3.5–5.1)
PROT SERPL-MCNC: 7.7 GM/DL (ref 5.8–7.6)
RBC # BLD AUTO: 3.98 X10(6)/MCL (ref 4.2–5.4)
SODIUM SERPL-SCNC: 140 MMOL/L (ref 136–145)
WBC # BLD AUTO: 4.66 X10(3)/MCL (ref 4.5–11.5)

## 2025-07-01 PROCEDURE — 80053 COMPREHEN METABOLIC PANEL: CPT

## 2025-07-01 PROCEDURE — 36415 COLL VENOUS BLD VENIPUNCTURE: CPT

## 2025-07-01 PROCEDURE — 85025 COMPLETE CBC W/AUTO DIFF WBC: CPT

## 2025-07-15 ENCOUNTER — OFFICE VISIT (OUTPATIENT)
Dept: RHEUMATOLOGY | Facility: CLINIC | Age: 70
End: 2025-07-15
Payer: MEDICARE

## 2025-07-15 VITALS
BODY MASS INDEX: 35.63 KG/M2 | RESPIRATION RATE: 18 BRPM | SYSTOLIC BLOOD PRESSURE: 132 MMHG | TEMPERATURE: 98 F | HEIGHT: 62 IN | DIASTOLIC BLOOD PRESSURE: 64 MMHG | HEART RATE: 85 BPM | OXYGEN SATURATION: 100 % | WEIGHT: 193.63 LBS

## 2025-07-15 DIAGNOSIS — Z79.899 DRUG-INDUCED IMMUNODEFICIENCY: ICD-10-CM

## 2025-07-15 DIAGNOSIS — I31.39 PERICARDIAL EFFUSION: ICD-10-CM

## 2025-07-15 DIAGNOSIS — R76.8 RHEUMATOID FACTOR POSITIVE: ICD-10-CM

## 2025-07-15 DIAGNOSIS — D84.821 DRUG-INDUCED IMMUNODEFICIENCY: ICD-10-CM

## 2025-07-15 DIAGNOSIS — I27.20 PULMONARY HTN: ICD-10-CM

## 2025-07-15 DIAGNOSIS — G47.33 OSA ON CPAP: ICD-10-CM

## 2025-07-15 DIAGNOSIS — J84.89 PROGRESSIVE FIBROSING INTERSTITIAL LUNG DISEASE: ICD-10-CM

## 2025-07-15 DIAGNOSIS — I10 PRIMARY HYPERTENSION: ICD-10-CM

## 2025-07-15 DIAGNOSIS — M89.50: ICD-10-CM

## 2025-07-15 DIAGNOSIS — E11.9 TYPE 2 DIABETES MELLITUS WITHOUT COMPLICATION, WITHOUT LONG-TERM CURRENT USE OF INSULIN: ICD-10-CM

## 2025-07-15 DIAGNOSIS — M34.9 SCLERODERMA: Primary | ICD-10-CM

## 2025-07-15 DIAGNOSIS — J84.9 ILD (INTERSTITIAL LUNG DISEASE): ICD-10-CM

## 2025-07-15 PROCEDURE — 99215 OFFICE O/P EST HI 40 MIN: CPT | Mod: S$PBB,,, | Performed by: INTERNAL MEDICINE

## 2025-07-15 PROCEDURE — G2211 COMPLEX E/M VISIT ADD ON: HCPCS | Mod: ,,, | Performed by: INTERNAL MEDICINE

## 2025-07-15 PROCEDURE — 99213 OFFICE O/P EST LOW 20 MIN: CPT | Mod: PBBFAC | Performed by: INTERNAL MEDICINE

## 2025-07-15 RX ORDER — HYDROCHLOROTHIAZIDE 25 MG/1
25 TABLET ORAL EVERY MORNING
COMMUNITY
Start: 2025-05-15

## 2025-07-15 RX ORDER — NINTEDANIB 150 MG/1
150 CAPSULE ORAL 2 TIMES DAILY
Qty: 60 CAPSULE | Refills: 6 | Status: SHIPPED | OUTPATIENT
Start: 2025-07-15

## 2025-07-15 RX ORDER — ROSUVASTATIN CALCIUM 10 MG/1
10 TABLET, COATED ORAL NIGHTLY
COMMUNITY
Start: 2025-06-25

## 2025-07-15 RX ORDER — MYCOPHENOLATE MOFETIL 500 MG/1
TABLET ORAL
Qty: 120 TABLET | Refills: 3 | Status: SHIPPED | OUTPATIENT
Start: 2025-07-15

## 2025-07-15 NOTE — PROGRESS NOTES
Patient ID: [id number]     Chief Complaint: Follow-up (Patient is here for followup scleroderma. She denies any complaints today. She is taking ofev and mycophenolate as ordered. SOB has improved. )      HPI:     Yosvany Song is a 69 y.o. female here today for follow-up of scleroderma.    She has type 2 diabetes, hypertension, asthma/COPD, obstructive sleep apnea on CPAP.   She saw Dr. Rai she was diagnosed with RA and was treated with methotrexate, has not been on it for more than a year.  She was seen by me at [facility] [place] in the past. I diagnosed her with scleroderma due to positive VIKRAM and high titer RNA claudette III and started her on Plaquenil.      She has interstitial lung disease and pulmonary hypertension from scleroderma.  She is currently taking CellCept 1000 mg in the morning and 500 mg at night. Did not tolerate higher dose due to headache.    She was referred to Pulmonary hypertension Clinic in [place], after evaluation tadalafil was prescribed, she is taking it 20 mg BID, SOB and orthopnea resolved.   She is able to walk long distances and do all the activities with out SOB.   H/o episodes of SOB, gets these episodes every 3-4 months.  She went to ER in [facility] in March 2025, she was told she had fluid around her heart and was discharged on Lasix. No episodes since March 2025.  Taking colchicine 0.6 mg b.i.d., tolerating it well without any issues.  Intermittent joint pain in right ankle and foot, does not have joint pain all day every day.  Denies red, warm and swollen joints.  No morning stiffness.   Denies any episodes of low grade fevers, rashes, morning stiffness, myalgias, joint pains, red, swollen, inflamed joints.     Pulmonologist Dr Prince.  Cardiologist Dr Manny Burnham.  Scheduled to see Dr Llamas 7/22/25.    Denies history of photosensitivity, oral or nasal ulcers, h/o MI, stroke, seizures, h/o PE or DVT, Raynaud's phenomenon, uveitis, malignancies.   Family history of autoimmune  disease: None  Pregnancies: 2  Miscarriages: 1 1st trimester  Surgical history : breast lumpectomy (2022) , hernia repair surgery (around 6 yrs ago)  Smoking: never. (Passive smoking from )    Social History     Tobacco Use   Smoking Status Never   Smokeless Tobacco Never        Past medical history  -------------------------------------    COPD (chronic obstructive pulmonary disease)    COPD (chronic obstructive pulmonary disease)    Rheumatoid arthritis, unspecified    Type 2 diabetes mellitus without complications        Past Surgical History:   Procedure Laterality Date    BREAST SURGERY      RIGHT HEART CATHETERIZATION Right 4/25/2025    Procedure: INSERTION, CATHETER, RIGHT HEART;  Surgeon: Kenyatta Perez MD;  Location[facility] CATH LAB;  Service: Cardiology;  Laterality: Right;       Review of patient's allergies indicates:   Allergen Reactions    Azithromycin Itching and Swelling     Other reaction(s): rash    Levofloxacin Itching and Swelling     Other reaction(s): rash    Shellfish containing products Swelling    Morphine Other (See Comments)       Outpatient Medications Marked as Taking for the 7/15/25 encounter (Office Visit) with Aries Ames MD   Medication Sig Dispense Refill    alendronate (FOSAMAX) 70 MG tablet Take 70 mg by mouth every 7 days.      amlodipine-benazepril 5-20 mg (LOTREL) 5-20 mg per capsule Take 2 capsules by mouth once daily.      cholecalciferol, vitamin D3, 1,250 mcg (50,000 unit) capsule Take 50,000 Units by mouth every 7 days.      folic acid (FOLVITE) 1 MG tablet Take 1 tablet (1,000 mcg total) by mouth once daily. 30 tablet 5    gas permeable  (OXYGEN PERMEABLE  MISC) 2L/min cont      hydroCHLOROthiazide (HYDRODIURIL) 25 MG tablet Take 25 mg by mouth every morning.      JANUVIA 100 mg Tab Take 100 mg by mouth once daily.      OZEMPIC 0.25 mg or 0.5 mg(2 mg/1.5 mL) pen injector Inject 0.5 mg into the skin every 7 days.      rosuvastatin  (CRESTOR) 10 MG tablet Take 10 mg by mouth every evening.      TRELEGY ELLIPTA 100-62.5-25 mcg DsDv Take 1 puff by mouth.      TRUE METRIX GLUCOSE TEST STRIP Strp SMARTSIG:Via Meter Daily      [DISCONTINUED] colchicine (COLCRYS) 0.6 mg tablet Take 1 tablet (0.6 mg total) by mouth 2 (two) times daily. 60 tablet 3    [DISCONTINUED] mycophenolate (CELLCEPT) 500 mg Tab 1000 mg in the morning and 500 mg at night. 120 tablet 3    [DISCONTINUED] nintedanib (OFEV) 150 mg Cap Take 1 capsule (150 mg total) by mouth 2 (two) times a day. 60 capsule 6     Current Facility-Administered Medications for the 7/15/25 encounter (Office Visit) with Aries Ames MD   Medication Dose Route Frequency Provider Last Rate Last Admin    [DISCONTINUED] albuterol nebulizer solution 2.5 mg  2.5 mg Nebulization Q4H PRN Aries Ames MD   2.5 mg at 03/12/25 1251       Social History     Socioeconomic History    Marital status:    Tobacco Use    Smoking status: Never    Smokeless tobacco: Never   Substance and Sexual Activity    Alcohol use: Not Currently    Drug use: Never     Social Drivers of Health     Financial Resource Strain: Low Risk  (4/19/2025)    Overall Financial Resource Strain (CARDIA)     Difficulty of Paying Living Expenses: Not hard at all   Food Insecurity: No Food Insecurity (4/19/2025)    Hunger Vital Sign     Worried About Running Out of Food in the Last Year: Never true     Ran Out of Food in the Last Year: Never true   Transportation Needs: No Transportation Needs (4/19/2025)    PRAPARE - Transportation     Lack of Transportation (Medical): No     Lack of Transportation (Non-Medical): No   Physical Activity: Insufficiently Active (4/19/2025)    Exercise Vital Sign     Days of Exercise per Week: 4 days     Minutes of Exercise per Session: 10 min   Stress: No Stress Concern Present (4/19/2025)    [employer] - Occupational Stress Questionnaire     Feeling of Stress : Not at all    Housing Stability: Low Risk  (4/19/2025)    Housing Stability Vital Sign     Unable to Pay for Housing in the Last Year: No     Number of Times Moved in the Last Year: 0     Homeless in the Last Year: No        Family History   Problem Relation Name Age of Onset    Kidney disease Mother      Heart disease Father      Heart disease Sister          Immunization History   Administered Date(s) Administered    COVID-19, MRNA, LN-S, PF (Pfizer) (Gray Cap) 06/21/2022    COVID-19, MRNA, LN-S, PF (Pfizer) (Purple Cap) 02/25/2021, 03/19/2021, 10/02/2021    COVID-19, mRNA, LNP-S, PF (Moderna) Ages 12+ 10/04/2023    COVID-19, mRNA, LNP-S, bivalent booster, PF (PFIZER OMICRON) 09/30/2022    Influenza - Quadrivalent - High Dose - PF (65 years and older) 10/04/2023    Influenza - Quadrivalent - PF *Preferred* (6 months and older) 09/26/2017, 10/13/2020, 09/21/2022    Influenza - Trivalent - Fluad - Adjuvanted - PF (65 years and older 01/30/2025    Pneumococcal Conjugate - 20 Valent 05/01/2023    RSVpreF (Arexvy) 11/08/2023    Tdap 05/01/2023    Zoster Recombinant 10/06/2022, 05/01/2023       Patient Care Team:  Debora Javed APRN as PCP - General (Family Medicine)  Fany Bernabe, RN as Pulmonary Hypertension Coordinator (Advanced Heart Failure & Transplant Cardiology)  Matilde Driscoll, RN as Pulmonary Hypertension Coordinator (Advanced Heart Failure & Transplant Cardiology)     Subjective:     Constitutional:  Denies chills. Denies fever. Denies night sweats. Denies weight loss.   Ophthalmology: Denies blurred vision. Denies dry eyes. Denies eye pain. Denies Itching and redness.   ENT: Denies oral ulcers. Denies epistaxis. Denies dry mouth. Denies swollen glands.   Endocrine: Admits diabetes. Denies thyroid Problems.   Respiratory: Denies cough. Admits shortness of breath. Admits shortness of breath with exertion. Denies hemoptysis.   Cardiovascular: Denies chest pain at rest. Denies chest pain with  "exertion. Denies palpitations.    Gastrointestinal: Denies abdominal pain. Denies diarrhea. Denies nausea. Denies vomiting. Denies hematemesis or hematochezia. Admits heartburn.  Genitourinary: Denies blood in urine.  Musculoskeletal: See HPI for details  Integumentary: Denies rash. Denies photosensitivity.   Peripheral Vascular: Denies Ulcers of hands and/or feet. Denies Cold extremities.   Neurologic: Denies dizziness. Denies headache.  Denies loss of strength. Denies numbness or tingling.   Psychiatric: Denies depression. Denies anxiety. Denies suicidal/homicidal ideations.      Objective:     /64 (BP Location: Left arm, Patient Position: Sitting)   Pulse 85   Temp 98.2 °F (36.8 °C) (Oral)   Resp 18   Ht 5' 2" (1.575 m)   Wt 87.8 kg (193 lb 9.6 oz)   SpO2 100%   BMI 35.41 kg/m²     Physical Exam    General Appearance: alert, pleasant, in no acute distress.  Skin: Skin color, texture, turgor normal. No rashes or lesions.   Hyperpigmented macular rash to forehead, bridge of nose and chest with mild skin thickening.  Few dilated capillary nail folds.  No synovitis on exam.  Resorption of distal index fingers bilaterally.  No distal pitting or skin thickening of bilateral hands.  Eyes:  extraocular movement intact (EOMI), pupils equal, round, reactive to light and accommodation, conjunctiva clear.  ENT: No oral or nasal ulcers.  Neck:  Neck supple. No adenopathy.   Lungs: CTA throughout without crackles, rhonchi, or wheezes.   Heart: RRR w/o murmurs.  2+ palpable DP/TP pulse.  Abdomen: Soft, non-tender, no masses, rebound or guarding.  Neuro: Alert, oriented, CN II-XII GI, sensory and motor innervation intact.  Musculoskeletal: Second distal phalanx was short bilaterally. A heberden node present on the right middle finger.  No synovitis on exam.  Psych: Alert, oriented, normal eye contact.    Labs:     Records from [facility] [place]: 2019:  Rheumatoid factor 174.  Anti CCP weak positive 35, normal less than 20.  " Positive VIKRAM 1:1280, nuclear, speckled pattern.  Lupus anticoagulant, cardiolipin and beta 2 glycoprotein negative.  RNA polymerase 3 high titer positive, greater than 80.  Negative dsDNA, centromere, RNP, Smith, Scl 70, SSA, SSB, TPO, thyroglobulin.  ACE level low.    CT chest 2017: No PE, scattered atelectasis in bilateral lung bases.    Echocardiogram 2017: EF 61%, diastolic dysfunction.   2019: chest x-ray normal.  X-ray of left hand showed partial amputation of distal aspect of index finger, defect in distal tuft of bilateral index finger appear chronic, DJD changes in bilateral wrists, no erosions in bilateral MCPs, DJD of bilateral feet and moderate DJD of bilateral knees, no erosions.    CT chest 2019:  Mild chronic interstitial lung disease with mild bronchiectasis, no honeycombing, scattered nodes in mediastinum, prominence of pulmonary artery trunk, can not exclude pulmonary artery hypertension.    2019: PFTs:  FVC 85%, FEV1 54%, ratio 64, total lung capacity 122%, DLCO 58%, DLCO by VA 60%.    She was diagnosed with scleroderma, questionable Raynaud's, abnormal capillary nail folds, rash on chest and forehead with mild skin thickening, acral osteolysis and was treated with Plaquenil.  Discussed about risk of scleroderma renal crisis because of positive RNA polymerase 3.      7/22/24: VIKRAM 1:640 speckled. . CCP equivocal.   02/05/2025:  Upc 200 milligram/gram.  No protein and blood in urine.  ESR 40.  CMP okay.  CRP 20.  Hemoglobin 11.9.  C3, C4 okay.  RNA polymerase 3 positive, greater than 150.  05/08/2025:  CMP okay.  ESR 22.  Hemoglobin 11.7.  Hepatitis-C, hepatitis-B, HIV negative.  CRP normal.   Quantiferon tb negative.  07/01/2025: Potassium 3.3.  Alk phos low 38.  AST, ALT normal.  Hemoglobin 11.4.      2019: PFTs:  FVC 85%, FEV1 54%, ratio 64, total lung capacity 122%, DLCO 58%, DLCO by VA 60%.    PFTs 03/12/2025: FVC 65%, FEV1 60%, ratio 92, total lung capacity 86%, DLCO 81%, DLCO by VA 85%.      Echocardiogram 2017: EF 61%, diastolic dysfunction.   echo 11/2023:  EF greater than 55%, normal RVSP, mild MR/ER.  Echocardiogram 03/13/2025:  EF 62%, normal left ventricular systolic function.  Normal right ventricular systolic function.  PASP 49 mmHg. There is a moderate circumferential effusion. No indication of cardiac tamponade.     CT chest 2019:  Mild chronic interstitial lung disease with mild bronchiectasis, no honeycombing, scattered nodes in mediastinum, prominence of pulmonary artery trunk, can not exclude pulmonary artery hypertension.    CT chest 03/17/2025:  Bibasilar predominant subpleural interlobular septal thickening with ground-glass opacities, attenuation in basal segment of left lower lobe consistent with systemic sclerosis.  Minimal upper lobe subpleural interlobular septal thickening.  Nodular septal thickening in right upper lobe.  Pulmonary hypertension.  Pericardial effusion.  Discussed with Radiology, mild early interstitial lung disease.    2019:  Skin biopsy of right upper chest can/scleroderma.    Requested and reviewed cardiology records: 12/30/24: Dr Manny Burnham: She has COPD on home o2, IAM with CPAP, DM II, HTN, HLD, MR. 11/2023: EF >55%, mild MR/AR, normal RVSP- no value given. Scheduled for PET scan, due to h/o ER. ACS was ruled out. 12/24/24: CXR showed increased pulmonary vascular and interstitial markings. CBC, CMP ok. CPK normal. Normal troponin. , normal less than 125.   Cardiology records from Dr. Manny Burnham, echo 11/2023:  EF greater than 55%, normal RVSP, mild MR/ER.  2/3/25:  PET scan normal, myocardial perfusion normal no evidence of myocardial ischemia.    Assessment:       ICD-10-CM ICD-9-CM   1. Scleroderma  M34.9 710.1   2. Progressive fibrosing interstitial lung disease  J84.89 515   3. ILD (interstitial lung disease)  J84.9 515   4. 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 Primary hypertension  I10 401.9   7. IAM on CPAP   G47.33 327.23   8. Type 2 diabetes mellitus without complication, without long-term current use of insulin  E11.9 250.00   9. Drug-induced immunodeficiency  D84.821 279.3    Z79.899 E947.9   10. Rheumatoid factor positive  R76.8 795.79   11. Pericardial effusion  I31.39 423.9       Plan:       1. Scleroderma, ILD:  Positive VIKRAM and RNA polymerase 3- high titer.  Acral osteolysis of bilateral index fingers and few dilated capillary nail folds.  Mild ILD on CT chest in 2019, repeat CT chest in March 2025 showed worsening of ILD, bibasilar interlobular septal thickening with ground-glass opacities, right upper lobe, pulmonary hypertension in pericardial effusion.  PFTs showed decline in FVC from 85 to 65% in March 2025.  Echocardiogram in March 2025 showed PASP is elevated 49 mmHg.  - discussed the disease course and treatment options of scleroderma with the patient.  - progressive interstitial lung disease, did not tolerate higher dose of CellCept due to headache.  Continue with CellCept 1000 mg in the morning and 500 mg at night.  - progressive interstitial lung disease, started Ofev 150 mg b.i.d., tolerating it well without any issues.  - pulmonary hypertension:  Referred to Pulmonary hypertension Clinic in [place] for further evaluation and treatment.  She had started tadalafil b.i.d., shortness of breath has resolved.  Denies orthopnea.  - educated patient to try to avoid longstanding high-dose steroids due to increased risk of scleroderma renal crisis with RNA polymerase 3.  Patient verbalized understanding.  - Scheduled to see Dr Townsend on 7/22/25.   - hypokalemia on labs, advised to increase potassium in her diet.  Repeat labs with PCP showed potassium was normal per patient.    2. Pericardial effusion  - shortness of breath has resolved.  She is on colchicine 0.6 mg b.i.d. for 3 months, okay to discontinue the medication and we will monitor.  - Advised to continue close follow-up with cardiology.  -  requested and reviewed cardiology records. 12/30/24: Dr Manny Burnham: She has COPD on home o2, IAM with CPAP, DM II, HTN, HLD, MR. 11/2023: EF >55%, mild MR/AR, normal RVSP- no value given. Scheduled for PET scan, due to h/o ER. ACS was ruled out. 12/24/24: CXR showed increased pulmonary vascular and interstitial markings. CBC, CMP ok. CPK normal. Normal troponin. , normal less than 125. 2/3/25:  PET scan normal, myocardial perfusion normal no evidence of myocardial ischemia.   - echocardiogram in March 2025 showed PASP 49 mmHg.    3. Pul HTN:  Started Tadafil, RHC:  04/25/2025:  RA pressure 13/9 (90, RV pressure 44 x 3 (9), PA pressure 40 x 16 (28).  Wedge pressure 19 x 15 (14).  Continue follow-up with pulmonary hypertension specialist in [place].  - symptomatically improved with the treatment.    3. Positive rheumatoid factor, she does not have signs and symptoms of rheumatoid arthritis.  No synovitis on exam. Rheumatoid factor 174.  Anti CCP weak positive 35, normal less than 20.  Osteoarthritis in bilateral knees and in other joints.     4. History of miscarriage in 1st trimester, antiphospholipid panel negative in the past.    5. IAM on CPAP: Patient on CPAP at home. Follows up with pulmonology. Is compliant with CPAP per patient.     6. Chronic obstructive pulmonary disease: Intermittent use of oxygen per patient. No h/o of active smoking. Follows with pulmonology, continue follow up with Dr Prince. Scheduled to see Dr Townsend on 7/22/25.     7. Primary hypertension:  Currently taking amlodipine benazepril.  Hydrochlorothiazide was added per patient recently.  Advised to stay compliant with medications.     8. Type 2 diabetes mellitus: Care per primary care physician.    9. Drug-induced immunodeficiency: Persons with rheumatoid arthritis, lupus, psoriatic arthritis and other autoimmune diseases are at increased risk of cardiovascular disease including heart attack and stroke. We recommend that  all patients with these conditions have annual health maintenance exams including lipid measurements, blood pressure measurements, and smoking cessation counseling when applicable at their primary care provider's office.   Advised to stay up-to-date on age appropriate vaccinations and malignancy screening, including yearly skin exams.        Follow up in about 5 months (around 12/15/2025). In addition to their scheduled follow up, the patient has also been instructed to follow up on as needed basis.      Total time spent with patient and documentation is 45 minutes. All questions were answered to patient's satisfaction and patient verbalized understanding.

## 2025-07-17 ENCOUNTER — TELEPHONE (OUTPATIENT)
Dept: TRANSPLANT | Facility: CLINIC | Age: 70
End: 2025-07-17
Payer: MEDICARE

## 2025-07-17 NOTE — TELEPHONE ENCOUNTER
NN received notification from [facility] pharmacy that the patient does not owe them any out-of-pocket or co-pay balance. NN called the patient to notify her that she does not owe any money to [facility] for her Tadalafil. Patient verbalized understanding of all of the above information.

## 2025-09-03 ENCOUNTER — TELEPHONE (OUTPATIENT)
Dept: TRANSPLANT | Facility: CLINIC | Age: 70
End: 2025-09-03
Payer: MEDICARE

## 2025-09-05 ENCOUNTER — OFFICE VISIT (OUTPATIENT)
Dept: TRANSPLANT | Facility: CLINIC | Age: 70
End: 2025-09-05
Payer: MEDICARE

## 2025-09-05 ENCOUNTER — HOSPITAL ENCOUNTER (OUTPATIENT)
Dept: PULMONOLOGY | Facility: CLINIC | Age: 70
Discharge: HOME | End: 2025-09-05
Payer: MEDICARE

## 2025-09-05 ENCOUNTER — TELEPHONE (OUTPATIENT)
Dept: TRANSPLANT | Facility: CLINIC | Age: 70
End: 2025-09-05

## 2025-09-05 VITALS
BODY MASS INDEX: 35.33 KG/M2 | HEART RATE: 83 BPM | BODY MASS INDEX: 36.15 KG/M2 | SYSTOLIC BLOOD PRESSURE: 135 MMHG | DIASTOLIC BLOOD PRESSURE: 63 MMHG | WEIGHT: 196.44 LBS | OXYGEN SATURATION: 97 % | HEIGHT: 62 IN | HEIGHT: 62 IN | WEIGHT: 192 LBS

## 2025-09-05 DIAGNOSIS — I27.9 CHRONIC PULMONARY HEART DISEASE: ICD-10-CM

## 2025-09-05 DIAGNOSIS — I27.20 CHRONIC PULMONARY HYPERTENSION: ICD-10-CM

## 2025-09-05 DIAGNOSIS — M34.9 SCLERODERMA: ICD-10-CM

## 2025-09-05 DIAGNOSIS — J84.9 ILD (INTERSTITIAL LUNG DISEASE): ICD-10-CM

## 2025-09-05 DIAGNOSIS — I27.20 PULMONARY HTN: Primary | ICD-10-CM

## 2025-09-05 PROCEDURE — 99999 PR PBB SHADOW E&M-EST. PATIENT-LVL IV: CPT | Mod: PBBFAC,,, | Performed by: INTERNAL MEDICINE

## 2025-09-05 PROCEDURE — 99214 OFFICE O/P EST MOD 30 MIN: CPT | Mod: PBBFAC | Performed by: INTERNAL MEDICINE

## 2025-09-05 RX ORDER — OMEPRAZOLE 40 MG/1
40 CAPSULE, DELAYED RELEASE ORAL EVERY MORNING
COMMUNITY
Start: 2025-08-27

## (undated) DEVICE — CATH SWAN GANZ STND 7FR

## (undated) DEVICE — KIT MICROINTRODUCE MINI 5X10CM

## (undated) DEVICE — SET CO-SET CLOSED INJ

## (undated) DEVICE — TRANSDUCER ADULT DISP

## (undated) DEVICE — TRAY CATH LAB OMC

## (undated) DEVICE — COVER PROBE US 5.5X58L NON LTX

## (undated) DEVICE — SHEATH INTRODUCER 7FR 11CM